# Patient Record
Sex: MALE | Race: WHITE | NOT HISPANIC OR LATINO | Employment: FULL TIME | URBAN - METROPOLITAN AREA
[De-identification: names, ages, dates, MRNs, and addresses within clinical notes are randomized per-mention and may not be internally consistent; named-entity substitution may affect disease eponyms.]

---

## 2017-11-20 ENCOUNTER — CLINICAL SUPPORT (OUTPATIENT)
Dept: URGENT CARE | Facility: CLINIC | Age: 27
End: 2017-11-20

## 2017-11-20 DIAGNOSIS — Z00.00 PHYSICAL EXAM: Primary | ICD-10-CM

## 2017-11-20 PROCEDURE — 99499 UNLISTED E&M SERVICE: CPT | Mod: S$GLB,,,

## 2018-05-24 ENCOUNTER — CLINICAL SUPPORT (OUTPATIENT)
Dept: OCCUPATIONAL MEDICINE | Facility: CLINIC | Age: 28
End: 2018-05-24

## 2018-05-24 DIAGNOSIS — Z02.1 ENCOUNTER FOR PRE-EMPLOYMENT EXAMINATION: ICD-10-CM

## 2018-05-24 PROCEDURE — 99499 UNLISTED E&M SERVICE: CPT | Mod: S$GLB,,, | Performed by: PREVENTIVE MEDICINE

## 2018-09-06 ENCOUNTER — CLINICAL SUPPORT (OUTPATIENT)
Dept: URGENT CARE | Facility: CLINIC | Age: 28
End: 2018-09-06

## 2018-09-06 DIAGNOSIS — Z00.00 PHYSICAL EXAM: Primary | ICD-10-CM

## 2018-09-06 PROCEDURE — 99499 UNLISTED E&M SERVICE: CPT | Mod: S$GLB,,, | Performed by: PREVENTIVE MEDICINE

## 2020-05-19 ENCOUNTER — OCCUPATIONAL HEALTH (OUTPATIENT)
Dept: URGENT CARE | Facility: CLINIC | Age: 30
End: 2020-05-19
Payer: COMMERCIAL

## 2020-05-19 DIAGNOSIS — Z02.89 ENCOUNTER FOR EXAMINATION REQUIRED BY DEPARTMENT OF TRANSPORTATION (DOT): Primary | ICD-10-CM

## 2020-05-19 PROCEDURE — 99499 UNLISTED E&M SERVICE: CPT | Mod: S$GLB,,, | Performed by: NURSE PRACTITIONER

## 2020-05-19 PROCEDURE — 99499 PHYSICAL, RECERT DOT/CDL: ICD-10-PCS | Mod: S$GLB,,, | Performed by: NURSE PRACTITIONER

## 2020-07-23 DIAGNOSIS — Z00.00 ROUTINE GENERAL MEDICAL EXAMINATION AT A HEALTH CARE FACILITY: Primary | ICD-10-CM

## 2020-07-24 ENCOUNTER — CLINICAL SUPPORT (OUTPATIENT)
Dept: INTERNAL MEDICINE | Facility: CLINIC | Age: 30
End: 2020-07-24
Payer: COMMERCIAL

## 2020-07-24 ENCOUNTER — OFFICE VISIT (OUTPATIENT)
Dept: INTERNAL MEDICINE | Facility: CLINIC | Age: 30
End: 2020-07-24
Payer: COMMERCIAL

## 2020-07-24 ENCOUNTER — HOSPITAL ENCOUNTER (OUTPATIENT)
Dept: CARDIOLOGY | Facility: CLINIC | Age: 30
Discharge: HOME | End: 2020-07-24
Payer: COMMERCIAL

## 2020-07-24 ENCOUNTER — HOSPITAL ENCOUNTER (OUTPATIENT)
Dept: RADIOLOGY | Facility: HOSPITAL | Age: 30
Discharge: HOME | End: 2020-07-24
Attending: INTERNAL MEDICINE
Payer: COMMERCIAL

## 2020-07-24 VITALS
HEART RATE: 74 BPM | SYSTOLIC BLOOD PRESSURE: 134 MMHG | TEMPERATURE: 98 F | WEIGHT: 218 LBS | DIASTOLIC BLOOD PRESSURE: 76 MMHG

## 2020-07-24 DIAGNOSIS — Z00.00 ENCOUNTER FOR ANNUAL HEALTH EXAMINATION: Primary | ICD-10-CM

## 2020-07-24 DIAGNOSIS — Z00.00 ROUTINE GENERAL MEDICAL EXAMINATION AT A HEALTH CARE FACILITY: ICD-10-CM

## 2020-07-24 DIAGNOSIS — E78.00 PURE HYPERCHOLESTEROLEMIA: ICD-10-CM

## 2020-07-24 DIAGNOSIS — Z00.00 ROUTINE GENERAL MEDICAL EXAMINATION AT A HEALTH CARE FACILITY: Primary | ICD-10-CM

## 2020-07-24 DIAGNOSIS — R74.8 ELEVATED LIVER ENZYMES: ICD-10-CM

## 2020-07-24 PROBLEM — E78.5 HYPERLIPIDEMIA: Status: ACTIVE | Noted: 2020-07-24

## 2020-07-24 LAB
ALBUMIN SERPL BCP-MCNC: 4.5 G/DL (ref 3.5–5.2)
ALP SERPL-CCNC: 69 U/L (ref 55–135)
ALT SERPL W/O P-5'-P-CCNC: 83 U/L (ref 10–44)
ANION GAP SERPL CALC-SCNC: 7 MMOL/L (ref 8–16)
AST SERPL-CCNC: 52 U/L (ref 10–40)
BILIRUB SERPL-MCNC: 1 MG/DL (ref 0.1–1)
BUN SERPL-MCNC: 14 MG/DL (ref 6–20)
CALCIUM SERPL-MCNC: 9.2 MG/DL (ref 8.7–10.5)
CHLORIDE SERPL-SCNC: 106 MMOL/L (ref 95–110)
CHOLEST SERPL-MCNC: 265 MG/DL (ref 120–199)
CHOLEST/HDLC SERPL: 4.4 {RATIO} (ref 2–5)
CO2 SERPL-SCNC: 25 MMOL/L (ref 23–29)
CREAT SERPL-MCNC: 1.2 MG/DL (ref 0.5–1.4)
ERYTHROCYTE [DISTWIDTH] IN BLOOD BY AUTOMATED COUNT: 12.9 % (ref 11.5–14.5)
EST. GFR  (AFRICAN AMERICAN): >60 ML/MIN/1.73 M^2
EST. GFR  (NON AFRICAN AMERICAN): >60 ML/MIN/1.73 M^2
ESTIMATED AVG GLUCOSE: 91 MG/DL (ref 68–131)
GLUCOSE SERPL-MCNC: 90 MG/DL (ref 70–110)
HBA1C MFR BLD HPLC: 4.8 % (ref 4–5.6)
HCT VFR BLD AUTO: 52.4 % (ref 40–54)
HDLC SERPL-MCNC: 60 MG/DL (ref 40–75)
HDLC SERPL: 22.6 % (ref 20–50)
HGB BLD-MCNC: 17 G/DL (ref 14–18)
LDLC SERPL CALC-MCNC: 189 MG/DL (ref 63–159)
MCH RBC QN AUTO: 30.7 PG (ref 27–31)
MCHC RBC AUTO-ENTMCNC: 32.4 G/DL (ref 32–36)
MCV RBC AUTO: 95 FL (ref 82–98)
NONHDLC SERPL-MCNC: 205 MG/DL
PLATELET # BLD AUTO: 243 K/UL (ref 150–350)
PMV BLD AUTO: 9.4 FL (ref 9.2–12.9)
POTASSIUM SERPL-SCNC: 4.4 MMOL/L (ref 3.5–5.1)
PROT SERPL-MCNC: 7.5 G/DL (ref 6–8.4)
RBC # BLD AUTO: 5.53 M/UL (ref 4.6–6.2)
SODIUM SERPL-SCNC: 138 MMOL/L (ref 136–145)
TRIGL SERPL-MCNC: 80 MG/DL (ref 30–150)
WBC # BLD AUTO: 6.41 K/UL (ref 3.9–12.7)

## 2020-07-24 PROCEDURE — 93010 ELECTROCARDIOGRAM REPORT: CPT | Mod: S$GLB,,, | Performed by: INTERNAL MEDICINE

## 2020-07-24 PROCEDURE — 80061 LIPID PANEL: CPT

## 2020-07-24 PROCEDURE — 71046 XR CHEST PA AND LATERAL: ICD-10-PCS | Mod: 26,,, | Performed by: RADIOLOGY

## 2020-07-24 PROCEDURE — 85027 COMPLETE CBC AUTOMATED: CPT

## 2020-07-24 PROCEDURE — 99999 PR PBB SHADOW E&M-EST. PATIENT-LVL III: ICD-10-PCS | Mod: PBBFAC,,, | Performed by: INTERNAL MEDICINE

## 2020-07-24 PROCEDURE — 99385 PR PREVENTIVE VISIT,NEW,18-39: ICD-10-PCS | Mod: S$GLB,,, | Performed by: INTERNAL MEDICINE

## 2020-07-24 PROCEDURE — 93005 EKG 12-LEAD: ICD-10-PCS | Mod: S$GLB,,, | Performed by: INTERNAL MEDICINE

## 2020-07-24 PROCEDURE — 80053 COMPREHEN METABOLIC PANEL: CPT

## 2020-07-24 PROCEDURE — 93005 ELECTROCARDIOGRAM TRACING: CPT | Mod: S$GLB,,, | Performed by: INTERNAL MEDICINE

## 2020-07-24 PROCEDURE — 99385 PREV VISIT NEW AGE 18-39: CPT | Mod: S$GLB,,, | Performed by: INTERNAL MEDICINE

## 2020-07-24 PROCEDURE — 36415 COLL VENOUS BLD VENIPUNCTURE: CPT

## 2020-07-24 PROCEDURE — 83036 HEMOGLOBIN GLYCOSYLATED A1C: CPT

## 2020-07-24 PROCEDURE — 71046 X-RAY EXAM CHEST 2 VIEWS: CPT | Mod: 26,,, | Performed by: RADIOLOGY

## 2020-07-24 PROCEDURE — 71046 X-RAY EXAM CHEST 2 VIEWS: CPT | Mod: TC,FY

## 2020-07-24 PROCEDURE — 99999 PR PBB SHADOW E&M-EST. PATIENT-LVL III: CPT | Mod: PBBFAC,,, | Performed by: INTERNAL MEDICINE

## 2020-07-24 PROCEDURE — 93010 EKG 12-LEAD: ICD-10-PCS | Mod: S$GLB,,, | Performed by: INTERNAL MEDICINE

## 2020-07-24 NOTE — LETTER
7/24/2020    Freda Snyder  [address] [place] [address]       [facility]  [facility] [address]  Phone: [phone]  Fax: [phone] Dear Mr. Synder:    Thank you for allowing me to serve you and perform your Executive Health exam on 7/24/2020.  This letter will serve a brief summary of the history, findings and discussions at that time. I have included the lab and study results for you to have available at future healthcare appointments.      Reason for Visit: Executive Health Preventive Physical Examination      Past Medical History:  Past Medical History:   Diagnosis Date    Ngby-Rcyrt-Xcdgqay disease     Osgood-Schlatter's disease          Physical Exam: Skin tag on left mid buttocks noted. [employer], no concerning findings on exam.      Labs:  Comprehensive metabolic panel    Collection Time: 07/24/20 10:13 AM   Result Value Ref Range    Sodium 138 136 - 145 mmol/L    Potassium 4.4 3.5 - 5.1 mmol/L    Chloride 106 95 - 110 mmol/L    CO2 25 23 - 29 mmol/L    Glucose 90 70 - 110 mg/dL    BUN, Bld 14 6 - 20 mg/dL    Creatinine 1.2 0.5 - 1.4 mg/dL    Calcium 9.2 8.7 - 10.5 mg/dL    Total Protein 7.5 6.0 - 8.4 g/dL    Albumin 4.5 3.5 - 5.2 g/dL    Total Bilirubin 1.0 0.1 - 1.0 mg/dL    Alkaline Phosphatase 69 55 - 135 U/L    AST 52 (H) 10 - 40 U/L    ALT 83 (H) 10 - 44 U/L    Anion Gap 7 (L) 8 - 16 mmol/L    eGFR if African American >60.0 >60 mL/min/1.73 m^2    eGFR if non African American >60.0 >60 mL/min/1.73 m^2   CBC Without Differential    Collection Time: 07/24/20 10:13 AM   Result Value Ref Range    WBC 6.41 3.90 - 12.70 K/uL    RBC 5.53 4.60 - 6.20 M/uL    Hemoglobin 17.0 14.0 - 18.0 g/dL    Hematocrit 52.4 40.0 - 54.0 %    Mean Corpuscular Volume 95 82 - 98 fL    Mean Corpuscular Hemoglobin 30.7 27.0 - 31.0 pg    Mean Corpuscular Hemoglobin Conc 32.4 32.0 - 36.0 g/dL    RDW 12.9 11.5 - 14.5 %    Platelets 243 150 - 350 K/uL    MPV  9.4 9.2 - 12.9 fL   Lipid panel    Collection Time: 07/24/20 10:13 AM   Result Value Ref Range    Cholesterol 265 (H) 120 - 199 mg/dL    Triglycerides 80 30 - 150 mg/dL    HDL 60 40 - 75 mg/dL    LDL Cholesterol 189.0 (H) 63.0 - 159.0 mg/dL    Hdl/Cholesterol Ratio 22.6 20.0 - 50.0 %    Total Cholesterol/HDL Ratio 4.4 2.0 - 5.0    Non-HDL Cholesterol 205 mg/dL   Hemoglobin A1c    Collection Time: 07/24/20 10:13 AM   Result Value Ref Range    Hemoglobin A1C 4.8 4.0 - 5.6 %    Estimated Avg Glucose 91 68 - 131 mg/dL     EKG: Normal sinus rhythm    Chest Xray: No evidence of disease.      Assessment/Recommendations:    Health Maintenance and Prevention:  - Annual flu vaccine.  - Tetanus booster updated within past 10 years.    - Blood pressure and blood sugars in normal range.       High cholesterol - Significantly elevated but reasonable to focus for now on diet, weight loss, and alcohol moderation. Repeat cholesterol level in 3 months.     Elevated liver enzymes - Weight loss, cholesterol control, and alcohol moderation will all be very important in management. Can plan for repeat liver test in 3 months and may consider further testing at that time if still elevated.     Left earwax impaction - Try over the counter Debrox drops for 3 days.     Skin tag, left inner buttocks - Dermatology evaluation reasonable and suspect they could easily remove this if you were interested.       It was a pleasure meeting you for your health exam, Mr. Snyder. Plan to return in 3 months for follow up lab work.    If you have any questions or concerns, please don't hesitate to call.    Sincerely,    Ebenezer Quigley MD

## 2020-07-25 NOTE — PROGRESS NOTES
Subjective:       Patient ID: Freda Snyder is a 29 y.o. male.    Chief Complaint: Annual Exam    HPI:  Here for annual health exam. No acute complaints. Long standing lesion on inner buttocks that gets irritated occasionally. Works for [employer]. Admits to regular alcohol use but typically keeps it to a glass of whiskey or couple glasses of wine.   Hx of Osgood Schlatter and Leg Calve Perthies disease. No surgery done and remains well functioning.    HM:  Tetanus booster w/i 10 yrs.  Adopted. Requesting family medical hx though.    Review of Systems   Constitutional: Negative for fatigue, fever and unexpected weight change.   HENT: Negative for hearing loss and sinus pain.    Eyes: Negative for visual disturbance.   Respiratory: Negative for cough and shortness of breath.    Cardiovascular: Negative for chest pain and leg swelling.   Gastrointestinal: Negative for abdominal pain, diarrhea and nausea.   Genitourinary: Negative for difficulty urinating, dysuria and frequency.   Musculoskeletal: Positive for arthralgias. Negative for joint swelling.   Skin: Negative for rash and wound.   Neurological: Negative for dizziness, weakness and headaches.   Psychiatric/Behavioral: Negative for dysphoric mood. The patient is not nervous/anxious.        Past Medical History:   Diagnosis Date    Elbn-Peftw-Hccafxl disease     Osgood-Schlatter's disease      No current outpatient medications on file.     Past Surgical History:   Procedure Laterality Date    TONSILLECTOMY       Family History   Adopted: Yes     Social History     Tobacco Use    Smoking status: Former Smoker   Substance Use Topics    Alcohol use: Yes     Frequency: 2-3 times a week     Comment: Glass of whiskey or couple glasses of wine typically.       Objective:      Vitals:    07/24/20 1050   BP: 134/76   Pulse: 74   Temp: 98.2 °F (36.8 °C)     Physical Exam  Constitutional:       General: He is not in acute distress.     Appearance: Normal  appearance. He is well-developed. He is not ill-appearing.   HENT:      Head: Normocephalic and atraumatic.      Right Ear: Hearing and external ear normal. There is no impacted cerumen.      Left Ear: Hearing and external ear normal. There is impacted cerumen.   Eyes:      Extraocular Movements: Extraocular movements intact.      Conjunctiva/sclera: Conjunctivae normal.   Cardiovascular:      Rate and Rhythm: Normal rate and regular rhythm.      Heart sounds: Normal heart sounds. No murmur.   Pulmonary:      Effort: Pulmonary effort is normal. No respiratory distress.      Breath sounds: Normal breath sounds.   Abdominal:      General: Abdomen is flat. There is no distension.      Palpations: Abdomen is soft.      Tenderness: There is no abdominal tenderness.   Musculoskeletal:         General: No swelling or deformity.   Skin:     General: Skin is warm and dry.      Findings: Lesion (Left buttocks lesion consistent with skin tag, non-inflamed currently.) present. No rash.   Neurological:      General: No focal deficit present.      Mental Status: He is alert and oriented to person, place, and time.      Cranial Nerves: No cranial nerve deficit.      Coordination: Coordination normal.      Gait: Gait normal.   Psychiatric:         Mood and Affect: Mood normal.         Behavior: Behavior normal.         Thought Content: Thought content normal.         Judgment: Judgment normal.       Recent Results (from the past 2016 hour(s))   Comprehensive metabolic panel    Collection Time: 07/24/20 10:13 AM   Result Value Ref Range    Sodium 138 136 - 145 mmol/L    Potassium 4.4 3.5 - 5.1 mmol/L    Chloride 106 95 - 110 mmol/L    CO2 25 23 - 29 mmol/L    Glucose 90 70 - 110 mg/dL    BUN, Bld 14 6 - 20 mg/dL    Creatinine 1.2 0.5 - 1.4 mg/dL    Calcium 9.2 8.7 - 10.5 mg/dL    Total Protein 7.5 6.0 - 8.4 g/dL    Albumin 4.5 3.5 - 5.2 g/dL    Total Bilirubin 1.0 0.1 - 1.0 mg/dL    Alkaline Phosphatase 69 55 - 135 U/L    AST 52 (H)  10 - 40 U/L    ALT 83 (H) 10 - 44 U/L    Anion Gap 7 (L) 8 - 16 mmol/L    eGFR if African American >60.0 >60 mL/min/1.73 m^2    eGFR if non African American >60.0 >60 mL/min/1.73 m^2   CBC Without Differential    Collection Time: 07/24/20 10:13 AM   Result Value Ref Range    WBC 6.41 3.90 - 12.70 K/uL    RBC 5.53 4.60 - 6.20 M/uL    Hemoglobin 17.0 14.0 - 18.0 g/dL    Hematocrit 52.4 40.0 - 54.0 %    Mean Corpuscular Volume 95 82 - 98 fL    Mean Corpuscular Hemoglobin 30.7 27.0 - 31.0 pg    Mean Corpuscular Hemoglobin Conc 32.4 32.0 - 36.0 g/dL    RDW 12.9 11.5 - 14.5 %    Platelets 243 150 - 350 K/uL    MPV 9.4 9.2 - 12.9 fL   Lipid panel    Collection Time: 07/24/20 10:13 AM   Result Value Ref Range    Cholesterol 265 (H) 120 - 199 mg/dL    Triglycerides 80 30 - 150 mg/dL    HDL 60 40 - 75 mg/dL    LDL Cholesterol 189.0 (H) 63.0 - 159.0 mg/dL    Hdl/Cholesterol Ratio 22.6 20.0 - 50.0 %    Total Cholesterol/HDL Ratio 4.4 2.0 - 5.0    Non-HDL Cholesterol 205 mg/dL   Hemoglobin A1c    Collection Time: 07/24/20 10:13 AM   Result Value Ref Range    Hemoglobin A1C 4.8 4.0 - 5.6 %    Estimated Avg Glucose 91 68 - 131 mg/dL      EKG: NSR  CXR: No obvious disease seen.      Assessment/Plan:     1) Health Maintenance and Prevention:  - Annual flu vaccine  - Tetanus booster w/i 10 years.  - BP, BG wnl.    2) HLD - . Discussed significant elevation. Discussed diet, weight loss goals, alcohol moderation. Repeat lipid panel in 3 months.    3) Elevated liver enzymes - Moderate with ALT>AST. High risk for NAFLD. Discussed goals for weight loss and cholesterol control with plan for repeat CMP in 3 months. Hep screening and imaging at that time if still elevated.    4) Left ear cerumen impaction, approximately 90% but with hearing intact - Discussed trying OTC Debrox then consider going for removal to ENT or local clinic which he has done in past if persistent.    5) Skin tag, left inner buttocks - Occasional irritation.  Discussed Derm for removal.

## 2020-08-17 ENCOUNTER — OFFICE VISIT (OUTPATIENT)
Dept: URGENT CARE | Facility: CLINIC | Age: 30
End: 2020-08-17
Payer: OTHER MISCELLANEOUS

## 2020-08-17 DIAGNOSIS — M79.672 LEFT FOOT PAIN: ICD-10-CM

## 2020-08-17 DIAGNOSIS — S90.32XA CONTUSION OF LEFT FOOT, INITIAL ENCOUNTER: Primary | ICD-10-CM

## 2020-08-17 DIAGNOSIS — Z02.83 ENCOUNTER FOR DRUG SCREENING: ICD-10-CM

## 2020-08-17 PROCEDURE — 99214 PR OFFICE/OUTPT VISIT, EST, LEVL IV, 30-39 MIN: ICD-10-PCS | Mod: 25,S$GLB,, | Performed by: NURSE PRACTITIONER

## 2020-08-17 PROCEDURE — 99214 OFFICE O/P EST MOD 30 MIN: CPT | Mod: 25,S$GLB,, | Performed by: NURSE PRACTITIONER

## 2020-08-17 PROCEDURE — 80305 OOH COLLECTION ONLY DRUG SCREEN: ICD-10-PCS | Mod: S$GLB,,, | Performed by: NURSE PRACTITIONER

## 2020-08-17 PROCEDURE — 73630 XR FOOT COMPLETE 3 VIEW LEFT: ICD-10-PCS | Mod: FY,LT,S$GLB, | Performed by: RADIOLOGY

## 2020-08-17 PROCEDURE — 73630 X-RAY EXAM OF FOOT: CPT | Mod: FY,LT,S$GLB, | Performed by: RADIOLOGY

## 2020-08-17 PROCEDURE — 80305 DRUG TEST PRSMV DIR OPT OBS: CPT | Mod: S$GLB,,, | Performed by: NURSE PRACTITIONER

## 2020-08-17 RX ORDER — IBUPROFEN 200 MG
400 TABLET ORAL
COMMUNITY
Start: 2020-08-17 | End: 2021-08-16

## 2020-08-17 NOTE — LETTER
[facility]  [address], [address]  [place] [address]  Phone: [phone]  Fax: [phone]  [facility]: [id number]     Name: Freda Snyder  Injury Date: 08/12/2020   Employee ID: [id number] Date of Treatment: 08/17/2020   Company: [employer]      Appointment Time: Arrived: 9:19 AM   Provider: Sally Contreras NP Time Out: 12:01 PM     Office Treatment:   1. Contusion of left foot, initial encounter    2. Left foot pain    3. Encounter for drug screening      Medications Ordered This Encounter   Medications    ibuprofen (ADVIL,MOTRIN) 200 MG tablet      Patient Instructions: Attention not to aggravate affected area, Daily home exercises/warm soaks(May alternate ice and heat.)    Restrictions: Sedentary work only     Return Appointment: 8/24/2020 at 10:00 AM       KD

## 2020-08-17 NOTE — PROGRESS NOTES
Subjective:       Patient ID: Freda Snyder is a 29 y.o. male.    Chief Complaint: Foot Injury (LT)    Pt works for [employer] as a [employer]. Pt states he was at work on 8/12/2020 on a 5 ft pallet on the back of the truck reaching to grab a box of the shelf when his LT FOOT slipped down and went through a hole. Pt states he didn't feel any pain but he did have some swelling. On Saturday the swelling went away but his big toe on his LT FOOT began to have sharp and aching pain. He was not seen anywhere his is his initial visit. IJ  No previous injury. He applied a compression sleeve, elevated foot and applied ice. Took Ibuprofen 400 mg with relief. Says the swelling has resolved and and bruising is improving. Has pain with ambulation and weight bearing. MWT    Foot Injury   The incident occurred 3 to 5 days ago. The incident occurred at work. The injury mechanism was a direct blow. The pain is present in the left foot. The quality of the pain is described as aching (sharp). The pain is at a severity of 5/10. The pain is moderate. The pain has been constant since onset. Pertinent negatives include no inability to bear weight, muscle weakness or numbness. He reports no foreign bodies present. The symptoms are aggravated by palpation and weight bearing. He has tried nothing for the symptoms. The treatment provided no relief.       Constitution: Negative for chills, fatigue and fever.   HENT: Negative for congestion and sore throat.    Neck: Negative for painful lymph nodes.   Cardiovascular: Negative for chest pain and leg swelling.   Eyes: Negative for double vision and blurred vision.   Respiratory: Negative for cough and shortness of breath.    Gastrointestinal: Negative for nausea, vomiting and diarrhea.   Genitourinary: Negative for dysuria, frequency and urgency.   Musculoskeletal: Positive for pain, joint pain, joint swelling, pain with walking and muscle cramps. Negative for muscle ache.   Skin:  Positive for bruising. Negative for pale and rash.   Allergic/Immunologic: Negative for seasonal allergies.   Neurological: Negative for dizziness, history of vertigo, light-headedness, passing out, headaches and numbness.   Hematologic/Lymphatic: Negative for swollen lymph nodes, easy bruising/bleeding and history of blood clots. Does not bruise/bleed easily.   Psychiatric/Behavioral: Negative for nervous/anxious, sleep disturbance and depression. The patient is not nervous/anxious.         Objective:      Physical Exam  Vitals signs and nursing note reviewed.   Constitutional:       Appearance: Normal appearance.   HENT:      Right Ear: External ear normal.      Left Ear: External ear normal.   Eyes:      Conjunctiva/sclera: Conjunctivae normal.   Cardiovascular:      Rate and Rhythm: Normal rate and regular rhythm.      Pulses: Normal pulses.      Heart sounds: Normal heart sounds.   Pulmonary:      Effort: Pulmonary effort is normal.      Breath sounds: Normal breath sounds.   Abdominal:      General: Bowel sounds are normal.      Palpations: Abdomen is soft.   Musculoskeletal:         General: Tenderness present.      Left ankle: Normal.      Left foot: Decreased range of motion. Normal capillary refill. Tenderness and bony tenderness present. No swelling, deformity or laceration.        Feet:       Comments: TTP to medial aspect L foot just proximal to great toe. Mild erythema, ecchymosis resolving. Pain with dorsiflexion and plantarflexion especially dorsiflexion of great toe. Antalgic gait. NV intact.   Skin:     General: Skin is warm and dry.   Neurological:      General: No focal deficit present.      Mental Status: He is alert and oriented to person, place, and time.   Psychiatric:         Mood and Affect: Mood normal.         Behavior: Behavior normal.         Thought Content: Thought content normal.         Judgment: Judgment normal.         Assessment:       1. Contusion of left foot, initial encounter     2. Left foot pain    3. Encounter for drug screening        Plan:     X-ray Chest Pa And Lateral    Result Date: 7/24/2020  EXAMINATION: XR CHEST PA AND LATERAL CLINICAL HISTORY: Encounter for general adult medical examination without abnormal findings TECHNIQUE: PA and lateral views of the chest were performed. COMPARISON: None FINDINGS: Mediastinal structures are midline. Cardiac silhouette and pulmonary vascular distribution are normal. Lung volumes are normal and symmetric. I detect no pulmonary disease, pleural fluid, lymph node enlargement, cardiac decompensation, pneumothorax, pneumomediastinum, pneumoperitoneum or significant osseous abnormality.     No convincing evidence of disease. Electronically signed by: Rizwana Moses MD Date:    07/24/2020 Time:    10:44    X-ray Foot Complete 3 View Left    Result Date: 8/17/2020  EXAMINATION: XR FOOT COMPLETE 3 VIEW LEFT CLINICAL HISTORY: trauma;.  Contusion of left foot, initial encounter TECHNIQUE: AP, lateral and oblique views of the left foot were performed. COMPARISON: None FINDINGS: No fracture or dislocation.  No bone destruction identified     See above Electronically signed by: Juan M Barry MD Date:    08/17/2020 Time:    11:19   Reviewed x-ray results with patient. No fracture or dislocation.  .mwt  Medications Ordered This Encounter   Medications    ibuprofen (ADVIL,MOTRIN) 200 MG tablet     Sig: Take 2 tablets (400 mg total) by mouth after meals as needed for Other (For pain and inflammation.).     Patient Instructions: Attention not to aggravate affected area, Daily home exercises/warm soaks(May alternate ice and heat.)   Restrictions: Sedentary work only  Follow up in about 1 week (around 8/24/2020).

## 2020-08-24 ENCOUNTER — OFFICE VISIT (OUTPATIENT)
Dept: URGENT CARE | Facility: CLINIC | Age: 30
End: 2020-08-24
Payer: OTHER MISCELLANEOUS

## 2020-08-24 DIAGNOSIS — M79.672 LEFT FOOT PAIN: ICD-10-CM

## 2020-08-24 DIAGNOSIS — S90.32XD CONTUSION OF LEFT FOOT, SUBSEQUENT ENCOUNTER: Primary | ICD-10-CM

## 2020-08-24 PROCEDURE — 99213 OFFICE O/P EST LOW 20 MIN: CPT | Mod: S$GLB,,, | Performed by: NURSE PRACTITIONER

## 2020-08-24 PROCEDURE — 99213 PR OFFICE/OUTPT VISIT, EST, LEVL III, 20-29 MIN: ICD-10-PCS | Mod: S$GLB,,, | Performed by: NURSE PRACTITIONER

## 2020-08-24 NOTE — PROGRESS NOTES
Subjective:       Patient ID: Freda Snyder is a 29 y.o. male.    Chief Complaint: Foot Pain    Pt returned to the clinic for a follow up visit for [employer] on 8/12/2020. Pt states he is no longer in any pain. 0/10. IJ   Has some stiffness and very mild swelling at the end of the day. Taking Tylenol prn. Working LD. Feels a lot better this week. 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 MWT    Foot Injury   The incident occurred 3 to 5 days ago. The incident occurred at work. The injury mechanism was a direct blow. The pain is present in the left foot. Quality: sharp. The pain is at a severity of 0/10. The patient is experiencing no pain. The pain has been improving since onset. Pertinent negatives include no inability to bear weight, muscle weakness or numbness. He reports no foreign bodies present. He has tried nothing for the symptoms. The treatment provided no relief.       Constitution: Negative for chills, fatigue and fever.   HENT: Negative for congestion and sore throat.    Neck: Negative for painful lymph nodes.   Cardiovascular: Negative for chest pain and leg swelling.   Eyes: Negative for double vision and blurred vision.   Respiratory: Negative for cough and shortness of breath.    Gastrointestinal: Negative for nausea, vomiting and diarrhea.   Genitourinary: Negative for dysuria, frequency and urgency.   Musculoskeletal: Positive for pain. Negative for joint pain, joint swelling, pain with walking, muscle cramps and muscle ache.   Skin: Negative for pale, rash and bruising.   Allergic/Immunologic: Negative for seasonal allergies.   Neurological: Negative for dizziness, history of vertigo, light-headedness, passing out, headaches, numbness and tingling.   Hematologic/Lymphatic: Negative for swollen lymph nodes, easy bruising/bleeding and history of blood clots. Does not bruise/bleed easily.   Psychiatric/Behavioral: Negative for nervous/anxious, sleep disturbance and depression. The patient is not  nervous/anxious.         Objective:      Physical Exam  Constitutional:       General: He is not in acute distress.     Appearance: Normal appearance.   HENT:      Right Ear: External ear normal.      Left Ear: External ear normal.   Eyes:      Conjunctiva/sclera: Conjunctivae normal.   Cardiovascular:      Pulses: Normal pulses.   Pulmonary:      Effort: Pulmonary effort is normal.   Musculoskeletal: Normal range of motion.         General: No swelling.      Left foot: Normal range of motion and normal capillary refill. Tenderness present. No swelling or deformity.        Feet:       Comments: Mild tenderness to medial aspect L foot proximal to great toe. No swelling or ecchymosis this week. Ambulating well. Toe walks well. Mild discomfort with dorsiflexion of great toe when pushing off with ambulation. NV intact.   Skin:     General: Skin is warm and dry.      Capillary Refill: Capillary refill takes less than 2 seconds.   Neurological:      General: No focal deficit present.      Mental Status: He is alert and oriented to person, place, and time.   Psychiatric:         Mood and Affect: Mood normal.         Behavior: Behavior normal.         Thought Content: Thought content normal.         Judgment: Judgment normal.         Assessment:       1. Contusion of left foot, subsequent encounter    2. Left foot pain        Plan:            Patient Instructions: Attention not to aggravate affected area, Daily home exercises/warm soaks   Restrictions: Regular Duty, Discharged from Occupational Health  Follow up if symptoms worsen or fail to improve.

## 2020-08-24 NOTE — LETTER
[facility]  [address], [address]  [place] [address]  Phone: [phone]  Fax: [phone]  [facility]: [id number]    Pt Name: Freda Snyder  Injury Date: 08/12/2020   Employee ID: [id number] Date of Treatment: 08/24/2020   Company: [employer]      Appointment Time: 09:45 AM Arrived: 9:25 AM   Provider: Sally Contreras NP Time Out: 10:10 AM     Office Treatment:   EXAM  DISCHARGED FROM OCC HEALTH  REGULAR DUTY     1. Contusion of left foot, subsequent encounter    2. Left foot pain          Patient Instructions: Attention not to aggravate affected area, Daily home exercises/warm soaks    Restrictions: Regular Duty, Discharged from Occupational Health     Return Appointment: NONE

## 2020-10-02 ENCOUNTER — OFFICE VISIT (OUTPATIENT)
Dept: URGENT CARE | Facility: CLINIC | Age: 30
End: 2020-10-02
Payer: OTHER MISCELLANEOUS

## 2020-10-02 VITALS
BODY MASS INDEX: 32.89 KG/M2 | WEIGHT: 217 LBS | SYSTOLIC BLOOD PRESSURE: 128 MMHG | RESPIRATION RATE: 16 BRPM | OXYGEN SATURATION: 99 % | DIASTOLIC BLOOD PRESSURE: 84 MMHG | TEMPERATURE: 99 F | HEIGHT: 68 IN | HEART RATE: 64 BPM

## 2020-10-02 DIAGNOSIS — Z02.83 ENCOUNTER FOR DRUG SCREENING: Primary | ICD-10-CM

## 2020-10-02 DIAGNOSIS — S90.112D CONTUSION OF LEFT GREAT TOE WITHOUT DAMAGE TO NAIL, SUBSEQUENT ENCOUNTER: ICD-10-CM

## 2020-10-02 DIAGNOSIS — S93.502D SPRAIN OF LEFT GREAT TOE, SUBSEQUENT ENCOUNTER: ICD-10-CM

## 2020-10-02 PROCEDURE — 73630 XR FOOT COMPLETE 3 VIEW LEFT: ICD-10-PCS | Mod: FY,LT,S$GLB, | Performed by: RADIOLOGY

## 2020-10-02 PROCEDURE — 73630 X-RAY EXAM OF FOOT: CPT | Mod: FY,LT,S$GLB, | Performed by: RADIOLOGY

## 2020-10-02 PROCEDURE — 80305 OOH COLLECTION ONLY DRUG SCREEN: ICD-10-PCS | Mod: S$GLB,,, | Performed by: PREVENTIVE MEDICINE

## 2020-10-02 PROCEDURE — 99214 OFFICE O/P EST MOD 30 MIN: CPT | Mod: S$GLB,,, | Performed by: PREVENTIVE MEDICINE

## 2020-10-02 PROCEDURE — 99214 PR OFFICE/OUTPT VISIT, EST, LEVL IV, 30-39 MIN: ICD-10-PCS | Mod: S$GLB,,, | Performed by: PREVENTIVE MEDICINE

## 2020-10-02 PROCEDURE — 80305 DRUG TEST PRSMV DIR OPT OBS: CPT | Mod: S$GLB,,, | Performed by: PREVENTIVE MEDICINE

## 2020-10-02 RX ORDER — MELOXICAM 7.5 MG/1
7.5 TABLET ORAL 2 TIMES DAILY WITH MEALS
Qty: 30 TABLET | Refills: 1 | Status: SHIPPED | OUTPATIENT
Start: 2020-10-02 | End: 2021-08-16

## 2020-10-02 NOTE — PROGRESS NOTES
Subjective:       Patient ID: Freda Snyder is a 29 y.o. male.    Chief Complaint: Foot Injury    Pt works for [employer] as a [employer] . Pt states he was at work on 8/12/2020 on a 5 ft pallet on the back of the truck reaching to grab a box of the shelf when his LT FOOT slipped down and went through a hole. Pt states he didn't feel any pain but he did have some swelling. On the Saturday after the swelling went away but his big toe on his LT FOOT began to have sharp and aching pain. He was seen for this injury here by [facility]. He states he was not working on the truck for 2 months after but now he's back on the truck and having pain in his LT FOOT AGAIN. When flexing his foot he feels popping pain in his big toe. IJ    Foot Injury   The incident occurred more than 1 week ago. The incident occurred at work. The injury mechanism is unknown. The pain is present in the left foot. The quality of the pain is described as aching. The pain is at a severity of 6/10. The pain is mild. The pain has been constant since onset. Associated symptoms include muscle weakness. Pertinent negatives include no inability to bear weight or numbness. He reports no foreign bodies present. The symptoms are aggravated by weight bearing. He has tried nothing for the symptoms. The treatment provided no relief.       Constitution: Negative for chills, fatigue and fever.   HENT: Negative for congestion and sore throat.    Neck: Negative for painful lymph nodes.   Cardiovascular: Negative for chest pain and leg swelling.   Eyes: Negative for double vision and blurred vision.   Respiratory: Negative for cough and shortness of breath.    Gastrointestinal: Negative for nausea, vomiting and diarrhea.   Genitourinary: Negative for dysuria, frequency and urgency.   Musculoskeletal: Positive for pain. Negative for joint pain, joint swelling, pain with walking, muscle cramps and muscle ache.   Skin: Negative for pale, rash and bruising.    Allergic/Immunologic: Negative for seasonal allergies.   Neurological: Negative for dizziness, history of vertigo, light-headedness, passing out, headaches, numbness and tingling.   Hematologic/Lymphatic: Negative for swollen lymph nodes, easy bruising/bleeding and history of blood clots. Does not bruise/bleed easily.   Psychiatric/Behavioral: Negative for nervous/anxious, sleep disturbance and depression. The patient is not nervous/anxious.         Objective:      Physical Exam  Vitals signs and nursing note reviewed.   Constitutional:       Appearance: He is well-developed.   HENT:      Head: Normocephalic.   Eyes:      Pupils: Pupils are equal, round, and reactive to light.   Neck:      Musculoskeletal: Normal range of motion.   Cardiovascular:      Rate and Rhythm: Normal rate.   Pulmonary:      Effort: Pulmonary effort is normal.   Musculoskeletal:      Left foot: Decreased range of motion. Normal capillary refill. Tenderness and crepitus present. No bony tenderness, swelling, deformity or laceration.        Feet:       Comments: Patient complains of persistent pain and swelling about his left great toe primarily at the metatarsal phalangeal joint.  Patient also indicates that he has crepitus with flexion and extension of the great toe.  His left foot appears without ecchymosis and with minimal swelling at the base of the left great toe.  Vascular inflow including dorsalis pedis and posterior tibialis are both normal.   Skin:     General: Skin is warm and dry.   Neurological:      Mental Status: He is alert and oriented to person, place, and time.       X-ray Foot Complete Left    Result Date: 10/2/2020  EXAMINATION: XR FOOT COMPLETE 3 VIEW LEFT CLINICAL HISTORY: Contusion of left great toe without damage to nail, subsequent encounter FINDINGS: No fracture dislocation bone destruction seen.  No trauma seen. Electronically signed by: Javier Clemons MD Date:    10/02/2020 Time:    11:19    Assessment:       1.  Encounter for drug screening    2. Contusion of left great toe without damage to nail, subsequent encounter    3. Sprain of left great toe, subsequent encounter        Plan:       Discussed results of x-rays of the left foot with patient which revealed no acute fractures or bony abnormalities.  Patient will begin warm soaks with exercises on a daily basis.  Medications Ordered This Encounter   Medications    meloxicam (MOBIC) 7.5 MG tablet     Sig: Take 1 tablet (7.5 mg total) by mouth 2 (two) times daily with meals.     Dispense:  30 tablet     Refill:  1     Patient Instructions: Daily home exercises/warm soaks   Restrictions: Regular Duty  Follow up in about 1 week (around 10/9/2020).

## 2021-05-05 ENCOUNTER — IMMUNIZATION (OUTPATIENT)
Dept: PRIMARY CARE CLINIC | Facility: CLINIC | Age: 31
End: 2021-05-05
Payer: COMMERCIAL

## 2021-05-05 DIAGNOSIS — Z23 NEED FOR VACCINATION: Primary | ICD-10-CM

## 2021-05-05 PROCEDURE — 91303 COVID-19,VECTOR-NR,RS-AD26,PF,0.5 ML DOSE VACCINE (JANSSEN): ICD-10-PCS | Mod: S$GLB,,, | Performed by: FAMILY MEDICINE

## 2021-05-05 PROCEDURE — 91303 COVID-19,VECTOR-NR,RS-AD26,PF,0.5 ML DOSE VACCINE (JANSSEN): CPT | Mod: S$GLB,,, | Performed by: FAMILY MEDICINE

## 2021-05-05 PROCEDURE — 0031A COVID-19,VECTOR-NR,RS-AD26,PF,0.5 ML DOSE VACCINE (JANSSEN): ICD-10-PCS | Mod: CV19,S$GLB,, | Performed by: FAMILY MEDICINE

## 2021-05-05 PROCEDURE — 0031A COVID-19,VECTOR-NR,RS-AD26,PF,0.5 ML DOSE VACCINE (JANSSEN): CPT | Mod: CV19,S$GLB,, | Performed by: FAMILY MEDICINE

## 2021-08-16 ENCOUNTER — OFFICE VISIT (OUTPATIENT)
Dept: URGENT CARE | Facility: CLINIC | Age: 31
End: 2021-08-16
Payer: COMMERCIAL

## 2021-08-16 VITALS
RESPIRATION RATE: 19 BRPM | OXYGEN SATURATION: 98 % | BODY MASS INDEX: 32.58 KG/M2 | DIASTOLIC BLOOD PRESSURE: 79 MMHG | SYSTOLIC BLOOD PRESSURE: 128 MMHG | WEIGHT: 215 LBS | HEART RATE: 60 BPM | TEMPERATURE: 99 F | HEIGHT: 68 IN

## 2021-08-16 DIAGNOSIS — S63.601A SPRAIN OF RIGHT THUMB, UNSPECIFIED SITE OF DIGIT, INITIAL ENCOUNTER: Primary | ICD-10-CM

## 2021-08-16 DIAGNOSIS — R52 PAIN: ICD-10-CM

## 2021-08-16 PROCEDURE — 73140 X-RAY EXAM OF FINGER(S): CPT | Mod: FY,RT,S$GLB, | Performed by: RADIOLOGY

## 2021-08-16 PROCEDURE — 73140 XR FINGER 2 OR MORE VIEWS RIGHT: ICD-10-PCS | Mod: FY,RT,S$GLB, | Performed by: RADIOLOGY

## 2021-08-16 PROCEDURE — 99214 OFFICE O/P EST MOD 30 MIN: CPT | Mod: S$GLB,,, | Performed by: NURSE PRACTITIONER

## 2021-08-16 PROCEDURE — 99214 PR OFFICE/OUTPT VISIT, EST, LEVL IV, 30-39 MIN: ICD-10-PCS | Mod: S$GLB,,, | Performed by: NURSE PRACTITIONER

## 2021-08-16 RX ORDER — IBUPROFEN 600 MG/1
600 TABLET ORAL 3 TIMES DAILY PRN
Qty: 30 TABLET | Refills: 0 | Status: SHIPPED | OUTPATIENT
Start: 2021-08-16 | End: 2021-12-17

## 2021-08-18 ENCOUNTER — OFFICE VISIT (OUTPATIENT)
Dept: ORTHOPEDICS | Facility: CLINIC | Age: 31
End: 2021-08-18
Payer: COMMERCIAL

## 2021-08-18 VITALS — HEART RATE: 80 BPM | HEIGHT: 68 IN | WEIGHT: 215 LBS | BODY MASS INDEX: 32.58 KG/M2

## 2021-08-18 DIAGNOSIS — S63.641A RUPTURE OF RADIAL COLLATERAL LIGAMENT OF RIGHT THUMB, INITIAL ENCOUNTER: Primary | ICD-10-CM

## 2021-08-18 PROCEDURE — 99204 OFFICE O/P NEW MOD 45 MIN: CPT | Mod: S$GLB,,, | Performed by: ORTHOPAEDIC SURGERY

## 2021-08-18 PROCEDURE — 99204 PR OFFICE/OUTPT VISIT, NEW, LEVL IV, 45-59 MIN: ICD-10-PCS | Mod: S$GLB,,, | Performed by: ORTHOPAEDIC SURGERY

## 2021-08-18 PROCEDURE — 99999 PR PBB SHADOW E&M-EST. PATIENT-LVL II: ICD-10-PCS | Mod: PBBFAC,,, | Performed by: ORTHOPAEDIC SURGERY

## 2021-08-18 PROCEDURE — 99999 PR PBB SHADOW E&M-EST. PATIENT-LVL II: CPT | Mod: PBBFAC,,, | Performed by: ORTHOPAEDIC SURGERY

## 2021-08-24 ENCOUNTER — PATIENT MESSAGE (OUTPATIENT)
Dept: ORTHOPEDICS | Facility: CLINIC | Age: 31
End: 2021-08-24

## 2021-08-26 ENCOUNTER — HOSPITAL ENCOUNTER (OUTPATIENT)
Dept: RADIOLOGY | Facility: HOSPITAL | Age: 31
Discharge: HOME | End: 2021-08-26
Attending: ORTHOPAEDIC SURGERY
Payer: COMMERCIAL

## 2021-08-26 DIAGNOSIS — S63.641A RUPTURE OF RADIAL COLLATERAL LIGAMENT OF RIGHT THUMB, INITIAL ENCOUNTER: ICD-10-CM

## 2021-08-26 PROCEDURE — 73218 MRI UPPER EXTREMITY W/O DYE: CPT | Mod: TC,RT

## 2021-08-26 PROCEDURE — 73218 MRI UPPER EXTREMITY W/O DYE: CPT | Mod: 26,RT,, | Performed by: INTERNAL MEDICINE

## 2021-08-26 PROCEDURE — 73218 MRI THUMB WITHOUT CONTRAST RIGHT: ICD-10-PCS | Mod: 26,RT,, | Performed by: INTERNAL MEDICINE

## 2021-08-27 ENCOUNTER — TELEPHONE (OUTPATIENT)
Dept: ORTHOPEDICS | Facility: CLINIC | Age: 31
End: 2021-08-27

## 2021-09-15 ENCOUNTER — TELEPHONE (OUTPATIENT)
Dept: ORTHOPEDICS | Facility: CLINIC | Age: 31
End: 2021-09-15

## 2021-09-16 ENCOUNTER — OFFICE VISIT (OUTPATIENT)
Dept: ORTHOPEDICS | Facility: CLINIC | Age: 31
End: 2021-09-16
Payer: COMMERCIAL

## 2021-09-16 VITALS
SYSTOLIC BLOOD PRESSURE: 158 MMHG | DIASTOLIC BLOOD PRESSURE: 81 MMHG | HEIGHT: 68 IN | BODY MASS INDEX: 32.58 KG/M2 | WEIGHT: 215 LBS | HEART RATE: 70 BPM

## 2021-09-16 DIAGNOSIS — S63.641A RUPTURE OF RADIAL COLLATERAL LIGAMENT OF RIGHT THUMB, INITIAL ENCOUNTER: Primary | ICD-10-CM

## 2021-09-16 DIAGNOSIS — Z01.818 PRE-OP TESTING: Primary | ICD-10-CM

## 2021-09-16 PROCEDURE — 99214 PR OFFICE/OUTPT VISIT, EST, LEVL IV, 30-39 MIN: ICD-10-PCS | Mod: S$GLB,,, | Performed by: ORTHOPAEDIC SURGERY

## 2021-09-16 PROCEDURE — 99999 PR PBB SHADOW E&M-EST. PATIENT-LVL III: CPT | Mod: PBBFAC,,, | Performed by: ORTHOPAEDIC SURGERY

## 2021-09-16 PROCEDURE — 99214 OFFICE O/P EST MOD 30 MIN: CPT | Mod: S$GLB,,, | Performed by: ORTHOPAEDIC SURGERY

## 2021-09-16 PROCEDURE — 99999 PR PBB SHADOW E&M-EST. PATIENT-LVL III: ICD-10-PCS | Mod: PBBFAC,,, | Performed by: ORTHOPAEDIC SURGERY

## 2021-09-20 ENCOUNTER — PATIENT MESSAGE (OUTPATIENT)
Dept: ORTHOPEDICS | Facility: CLINIC | Age: 31
End: 2021-09-20

## 2021-09-21 ENCOUNTER — TELEPHONE (OUTPATIENT)
Dept: ORTHOPEDICS | Facility: CLINIC | Age: 31
End: 2021-09-21

## 2021-09-22 DIAGNOSIS — S63.641A RUPTURE OF RADIAL COLLATERAL LIGAMENT OF RIGHT THUMB, INITIAL ENCOUNTER: Primary | ICD-10-CM

## 2021-10-11 ENCOUNTER — ANESTHESIA EVENT (OUTPATIENT)
Dept: SURGERY | Facility: HOSPITAL | Age: 31
End: 2021-10-11
Payer: COMMERCIAL

## 2021-10-15 ENCOUNTER — TELEPHONE (OUTPATIENT)
Dept: ORTHOPEDICS | Facility: CLINIC | Age: 31
End: 2021-10-15

## 2021-10-20 DIAGNOSIS — Z98.890 POST-OPERATIVE STATE: Primary | ICD-10-CM

## 2021-10-20 RX ORDER — TRAMADOL HYDROCHLORIDE 50 MG/1
50 TABLET ORAL EVERY 6 HOURS PRN
Qty: 20 TABLET | Refills: 0 | Status: SHIPPED | OUTPATIENT
Start: 2021-10-20 | End: 2021-12-17

## 2021-10-21 ENCOUNTER — TELEPHONE (OUTPATIENT)
Dept: ORTHOPEDICS | Facility: CLINIC | Age: 31
End: 2021-10-21

## 2021-10-22 ENCOUNTER — HOSPITAL ENCOUNTER (OUTPATIENT)
Facility: HOSPITAL | Age: 31
Discharge: HOME | End: 2021-10-22
Attending: ORTHOPAEDIC SURGERY | Admitting: ORTHOPAEDIC SURGERY
Payer: COMMERCIAL

## 2021-10-22 ENCOUNTER — ANESTHESIA (OUTPATIENT)
Dept: SURGERY | Facility: HOSPITAL | Age: 31
End: 2021-10-22
Payer: COMMERCIAL

## 2021-10-22 DIAGNOSIS — S63.649A: Primary | ICD-10-CM

## 2021-10-22 PROCEDURE — 76942 ECHO GUIDE FOR BIOPSY: CPT | Performed by: ANESTHESIOLOGY

## 2021-10-22 PROCEDURE — D9220A PRA ANESTHESIA: ICD-10-PCS | Mod: ,,, | Performed by: ANESTHESIOLOGY

## 2021-10-22 PROCEDURE — 27201423 OPTIME MED/SURG SUP & DEVICES STERILE SUPPLY: Performed by: ORTHOPAEDIC SURGERY

## 2021-10-22 PROCEDURE — 37000009 HC ANESTHESIA EA ADD 15 MINS: Performed by: ORTHOPAEDIC SURGERY

## 2021-10-22 PROCEDURE — 25000003 PHARM REV CODE 250: Performed by: STUDENT IN AN ORGANIZED HEALTH CARE EDUCATION/TRAINING PROGRAM

## 2021-10-22 PROCEDURE — 99900035 HC TECH TIME PER 15 MIN (STAT)

## 2021-10-22 PROCEDURE — 71000033 HC RECOVERY, INTIAL HOUR: Performed by: ORTHOPAEDIC SURGERY

## 2021-10-22 PROCEDURE — 26540 PR FIX COLLAT LIG,MC-P JT,I-P JT: ICD-10-PCS | Mod: F5,,, | Performed by: ORTHOPAEDIC SURGERY

## 2021-10-22 PROCEDURE — 76942 ECHO GUIDE FOR BIOPSY: CPT | Mod: 26,,, | Performed by: ANESTHESIOLOGY

## 2021-10-22 PROCEDURE — 36000708 HC OR TIME LEV III 1ST 15 MIN: Performed by: ORTHOPAEDIC SURGERY

## 2021-10-22 PROCEDURE — 63600175 PHARM REV CODE 636 W HCPCS: Performed by: NURSE ANESTHETIST, CERTIFIED REGISTERED

## 2021-10-22 PROCEDURE — 63600175 PHARM REV CODE 636 W HCPCS: Performed by: ANESTHESIOLOGY

## 2021-10-22 PROCEDURE — 37000008 HC ANESTHESIA 1ST 15 MINUTES: Performed by: ORTHOPAEDIC SURGERY

## 2021-10-22 PROCEDURE — 25000003 PHARM REV CODE 250: Performed by: NURSE ANESTHETIST, CERTIFIED REGISTERED

## 2021-10-22 PROCEDURE — 64415 NJX AA&/STRD BRCH PLXS IMG: CPT | Mod: 59,RT,, | Performed by: ANESTHESIOLOGY

## 2021-10-22 PROCEDURE — 63600175 PHARM REV CODE 636 W HCPCS: Performed by: STUDENT IN AN ORGANIZED HEALTH CARE EDUCATION/TRAINING PROGRAM

## 2021-10-22 PROCEDURE — 36000709 HC OR TIME LEV III EA ADD 15 MIN: Performed by: ORTHOPAEDIC SURGERY

## 2021-10-22 PROCEDURE — 25000003 PHARM REV CODE 250: Performed by: ANESTHESIOLOGY

## 2021-10-22 PROCEDURE — D9220A PRA ANESTHESIA: Mod: ,,, | Performed by: ANESTHESIOLOGY

## 2021-10-22 PROCEDURE — 64415 PR NERVE BLOCK INJ, ANES/STEROID, BRACHIAL PLEXUS, INCL IMAG GUIDANCE: ICD-10-PCS | Mod: 59,RT,, | Performed by: ANESTHESIOLOGY

## 2021-10-22 PROCEDURE — 76942 PR U/S GUIDANCE FOR NEEDLE GUIDANCE: ICD-10-PCS | Mod: 26,,, | Performed by: ANESTHESIOLOGY

## 2021-10-22 PROCEDURE — C1713 ANCHOR/SCREW BN/BN,TIS/BN: HCPCS | Performed by: ORTHOPAEDIC SURGERY

## 2021-10-22 PROCEDURE — 71000015 HC POSTOP RECOV 1ST HR: Performed by: ORTHOPAEDIC SURGERY

## 2021-10-22 PROCEDURE — 94761 N-INVAS EAR/PLS OXIMETRY MLT: CPT

## 2021-10-22 PROCEDURE — 26540 REPAIR HAND JOINT: CPT | Mod: F5,,, | Performed by: ORTHOPAEDIC SURGERY

## 2021-10-22 DEVICE — ANCHOR SUT 3-0 FW KWIRE 1.35MM: Type: IMPLANTABLE DEVICE | Site: THUMB | Status: FUNCTIONAL

## 2021-10-22 RX ORDER — FENTANYL CITRATE 50 UG/ML
25 INJECTION, SOLUTION INTRAMUSCULAR; INTRAVENOUS EVERY 5 MIN PRN
Status: DISCONTINUED | OUTPATIENT
Start: 2021-10-22 | End: 2021-10-22 | Stop reason: HOSPADM

## 2021-10-22 RX ORDER — LIDOCAINE HYDROCHLORIDE 20 MG/ML
INJECTION INTRAVENOUS
Status: DISCONTINUED | OUTPATIENT
Start: 2021-10-22 | End: 2021-10-22

## 2021-10-22 RX ORDER — PROPOFOL 10 MG/ML
VIAL (ML) INTRAVENOUS CONTINUOUS PRN
Status: DISCONTINUED | OUTPATIENT
Start: 2021-10-22 | End: 2021-10-22

## 2021-10-22 RX ORDER — CEFAZOLIN SODIUM 1 G/3ML
2 INJECTION, POWDER, FOR SOLUTION INTRAMUSCULAR; INTRAVENOUS
Status: COMPLETED | OUTPATIENT
Start: 2021-10-22 | End: 2021-10-22

## 2021-10-22 RX ORDER — DEXAMETHASONE SODIUM PHOSPHATE 4 MG/ML
INJECTION, SOLUTION INTRA-ARTICULAR; INTRALESIONAL; INTRAMUSCULAR; INTRAVENOUS; SOFT TISSUE
Status: DISCONTINUED | OUTPATIENT
Start: 2021-10-22 | End: 2021-10-22

## 2021-10-22 RX ORDER — BUPIVACAINE HYDROCHLORIDE AND EPINEPHRINE 5; 5 MG/ML; UG/ML
INJECTION, SOLUTION EPIDURAL; INTRACAUDAL; PERINEURAL
Status: COMPLETED | OUTPATIENT
Start: 2021-10-22 | End: 2021-10-22

## 2021-10-22 RX ORDER — FENTANYL CITRATE 50 UG/ML
INJECTION, SOLUTION INTRAMUSCULAR; INTRAVENOUS
Status: DISCONTINUED | OUTPATIENT
Start: 2021-10-22 | End: 2021-10-22

## 2021-10-22 RX ORDER — ONDANSETRON 2 MG/ML
4 INJECTION INTRAMUSCULAR; INTRAVENOUS DAILY PRN
Status: DISCONTINUED | OUTPATIENT
Start: 2021-10-22 | End: 2021-10-22 | Stop reason: HOSPADM

## 2021-10-22 RX ORDER — SODIUM CHLORIDE 9 MG/ML
INJECTION, SOLUTION INTRAVENOUS CONTINUOUS
Status: DISCONTINUED | OUTPATIENT
Start: 2021-10-22 | End: 2021-10-22 | Stop reason: HOSPADM

## 2021-10-22 RX ORDER — MIDAZOLAM HYDROCHLORIDE 1 MG/ML
.5-4 INJECTION INTRAMUSCULAR; INTRAVENOUS
Status: DISCONTINUED | OUTPATIENT
Start: 2021-10-22 | End: 2021-10-22 | Stop reason: HOSPADM

## 2021-10-22 RX ORDER — FAMOTIDINE 10 MG/ML
INJECTION INTRAVENOUS
Status: DISCONTINUED | OUTPATIENT
Start: 2021-10-22 | End: 2021-10-22

## 2021-10-22 RX ORDER — ACETAMINOPHEN 500 MG
1000 TABLET ORAL
Status: COMPLETED | OUTPATIENT
Start: 2021-10-22 | End: 2021-10-22

## 2021-10-22 RX ORDER — FENTANYL CITRATE 50 UG/ML
25-200 INJECTION, SOLUTION INTRAMUSCULAR; INTRAVENOUS EVERY 5 MIN PRN
Status: DISCONTINUED | OUTPATIENT
Start: 2021-10-22 | End: 2021-10-22 | Stop reason: HOSPADM

## 2021-10-22 RX ORDER — MIDAZOLAM HYDROCHLORIDE 1 MG/ML
INJECTION, SOLUTION INTRAMUSCULAR; INTRAVENOUS
Status: DISCONTINUED | OUTPATIENT
Start: 2021-10-22 | End: 2021-10-22

## 2021-10-22 RX ORDER — CELECOXIB 200 MG/1
400 CAPSULE ORAL ONCE
Status: COMPLETED | OUTPATIENT
Start: 2021-10-22 | End: 2021-10-22

## 2021-10-22 RX ORDER — ACETAMINOPHEN, DIPHENHYDRAMINE HCL, PHENYLEPHRINE HCL 325; 25; 5 MG/1; MG/1; MG/1
TABLET ORAL
COMMUNITY

## 2021-10-22 RX ORDER — PROPOFOL 10 MG/ML
VIAL (ML) INTRAVENOUS
Status: DISCONTINUED | OUTPATIENT
Start: 2021-10-22 | End: 2021-10-22

## 2021-10-22 RX ADMIN — SODIUM CHLORIDE: 0.9 INJECTION, SOLUTION INTRAVENOUS at 12:10

## 2021-10-22 RX ADMIN — DEXAMETHASONE SODIUM PHOSPHATE 8 MG: 4 INJECTION, SOLUTION INTRAMUSCULAR; INTRAVENOUS at 01:10

## 2021-10-22 RX ADMIN — FAMOTIDINE 20 MG: 10 INJECTION, SOLUTION INTRAVENOUS at 01:10

## 2021-10-22 RX ADMIN — FENTANYL CITRATE 100 MCG: 50 INJECTION, SOLUTION INTRAMUSCULAR; INTRAVENOUS at 01:10

## 2021-10-22 RX ADMIN — PROPOFOL 50 MG: 10 INJECTION, EMULSION INTRAVENOUS at 01:10

## 2021-10-22 RX ADMIN — CEFAZOLIN 2 G: 330 INJECTION, POWDER, FOR SOLUTION INTRAMUSCULAR; INTRAVENOUS at 01:10

## 2021-10-22 RX ADMIN — MIDAZOLAM 2 MG: 1 INJECTION INTRAMUSCULAR; INTRAVENOUS at 12:10

## 2021-10-22 RX ADMIN — CELECOXIB 400 MG: 200 CAPSULE ORAL at 12:10

## 2021-10-22 RX ADMIN — LIDOCAINE HYDROCHLORIDE 75 MG: 20 INJECTION, SOLUTION INTRAVENOUS at 01:10

## 2021-10-22 RX ADMIN — MIDAZOLAM HYDROCHLORIDE 2 MG: 1 INJECTION, SOLUTION INTRAMUSCULAR; INTRAVENOUS at 01:10

## 2021-10-22 RX ADMIN — BUPIVACAINE HYDROCHLORIDE AND EPINEPHRINE BITARTRATE 15 ML: 5; .0091 INJECTION, SOLUTION EPIDURAL; INTRACAUDAL; PERINEURAL at 12:10

## 2021-10-22 RX ADMIN — PROPOFOL 200 MCG/KG/MIN: 10 INJECTION, EMULSION INTRAVENOUS at 01:10

## 2021-10-22 RX ADMIN — ACETAMINOPHEN 1000 MG: 500 TABLET ORAL at 12:10

## 2021-10-22 RX ADMIN — MEPIVACAINE HYDROCHLORIDE 15 ML: 15 INJECTION, SOLUTION EPIDURAL; INFILTRATION at 12:10

## 2021-10-22 RX ADMIN — FENTANYL CITRATE 100 MCG: 50 INJECTION, SOLUTION INTRAMUSCULAR; INTRAVENOUS at 12:10

## 2021-10-25 ENCOUNTER — PATIENT MESSAGE (OUTPATIENT)
Dept: ORTHOPEDICS | Facility: CLINIC | Age: 31
End: 2021-10-25
Payer: COMMERCIAL

## 2021-10-26 VITALS
TEMPERATURE: 98 F | HEART RATE: 77 BPM | WEIGHT: 215 LBS | SYSTOLIC BLOOD PRESSURE: 148 MMHG | DIASTOLIC BLOOD PRESSURE: 88 MMHG | RESPIRATION RATE: 20 BRPM | HEIGHT: 68 IN | OXYGEN SATURATION: 98 % | BODY MASS INDEX: 32.58 KG/M2

## 2021-11-05 ENCOUNTER — DOCUMENTATION ONLY (OUTPATIENT)
Dept: ORTHOPEDICS | Facility: CLINIC | Age: 31
End: 2021-11-05

## 2021-11-05 ENCOUNTER — PATIENT MESSAGE (OUTPATIENT)
Dept: ADMINISTRATIVE | Facility: OTHER | Age: 31
End: 2021-11-05
Payer: COMMERCIAL

## 2021-11-05 ENCOUNTER — OFFICE VISIT (OUTPATIENT)
Dept: ORTHOPEDICS | Facility: CLINIC | Age: 31
End: 2021-11-05
Payer: COMMERCIAL

## 2021-11-05 VITALS
WEIGHT: 215 LBS | HEIGHT: 68 IN | SYSTOLIC BLOOD PRESSURE: 131 MMHG | HEART RATE: 65 BPM | DIASTOLIC BLOOD PRESSURE: 83 MMHG | BODY MASS INDEX: 32.58 KG/M2

## 2021-11-05 DIAGNOSIS — Z47.89 ORTHOPEDIC AFTERCARE: ICD-10-CM

## 2021-11-05 DIAGNOSIS — S63.641A RUPTURE OF RADIAL COLLATERAL LIGAMENT OF RIGHT THUMB, INITIAL ENCOUNTER: Primary | ICD-10-CM

## 2021-11-05 PROCEDURE — 99024 PR POST-OP FOLLOW-UP VISIT: ICD-10-PCS | Mod: S$GLB,,, | Performed by: PHYSICIAN ASSISTANT

## 2021-11-05 PROCEDURE — 99999 PR PBB SHADOW E&M-EST. PATIENT-LVL III: ICD-10-PCS | Mod: PBBFAC,,, | Performed by: PHYSICIAN ASSISTANT

## 2021-11-05 PROCEDURE — 29075 PR APPLY FOREARM CAST: ICD-10-PCS | Mod: 58,RT,S$GLB, | Performed by: PHYSICIAN ASSISTANT

## 2021-11-05 PROCEDURE — 29075 APPL CST ELBW FNGR SHORT ARM: CPT | Mod: 58,RT,S$GLB, | Performed by: PHYSICIAN ASSISTANT

## 2021-11-05 PROCEDURE — 99999 PR PBB SHADOW E&M-EST. PATIENT-LVL III: CPT | Mod: PBBFAC,,, | Performed by: PHYSICIAN ASSISTANT

## 2021-11-05 PROCEDURE — 99024 POSTOP FOLLOW-UP VISIT: CPT | Mod: S$GLB,,, | Performed by: PHYSICIAN ASSISTANT

## 2021-11-19 ENCOUNTER — OFFICE VISIT (OUTPATIENT)
Dept: ORTHOPEDICS | Facility: CLINIC | Age: 31
End: 2021-11-19
Payer: COMMERCIAL

## 2021-11-19 ENCOUNTER — DOCUMENTATION ONLY (OUTPATIENT)
Dept: ORTHOPEDICS | Facility: CLINIC | Age: 31
End: 2021-11-19

## 2021-11-19 ENCOUNTER — CLINICAL SUPPORT (OUTPATIENT)
Dept: REHABILITATION | Facility: HOSPITAL | Age: 31
End: 2021-11-19
Payer: COMMERCIAL

## 2021-11-19 VITALS
SYSTOLIC BLOOD PRESSURE: 140 MMHG | HEIGHT: 68 IN | DIASTOLIC BLOOD PRESSURE: 88 MMHG | BODY MASS INDEX: 32.58 KG/M2 | WEIGHT: 215 LBS | HEART RATE: 78 BPM

## 2021-11-19 DIAGNOSIS — S63.641A RUPTURE OF RADIAL COLLATERAL LIGAMENT OF RIGHT THUMB, INITIAL ENCOUNTER: Primary | ICD-10-CM

## 2021-11-19 DIAGNOSIS — S63.641A RUPTURE OF RADIAL COLLATERAL LIGAMENT OF RIGHT THUMB, INITIAL ENCOUNTER: ICD-10-CM

## 2021-11-19 DIAGNOSIS — Z47.89 ORTHOPEDIC AFTERCARE: ICD-10-CM

## 2021-11-19 PROCEDURE — L3913 HFO W/O JOINTS CF: HCPCS | Mod: PO

## 2021-11-19 PROCEDURE — 99999 PR PBB SHADOW E&M-EST. PATIENT-LVL III: CPT | Mod: PBBFAC,,, | Performed by: PHYSICIAN ASSISTANT

## 2021-11-19 PROCEDURE — 97110 THERAPEUTIC EXERCISES: CPT | Mod: PO

## 2021-11-19 PROCEDURE — 99024 POSTOP FOLLOW-UP VISIT: CPT | Mod: S$GLB,,, | Performed by: PHYSICIAN ASSISTANT

## 2021-11-19 PROCEDURE — 99024 PR POST-OP FOLLOW-UP VISIT: ICD-10-PCS | Mod: S$GLB,,, | Performed by: PHYSICIAN ASSISTANT

## 2021-11-19 PROCEDURE — 99999 PR PBB SHADOW E&M-EST. PATIENT-LVL III: ICD-10-PCS | Mod: PBBFAC,,, | Performed by: PHYSICIAN ASSISTANT

## 2021-12-10 ENCOUNTER — CLINICAL SUPPORT (OUTPATIENT)
Dept: REHABILITATION | Facility: HOSPITAL | Age: 31
End: 2021-12-10
Payer: COMMERCIAL

## 2021-12-10 DIAGNOSIS — M79.644 PAIN OF RIGHT THUMB: ICD-10-CM

## 2021-12-10 PROCEDURE — 97166 OT EVAL MOD COMPLEX 45 MIN: CPT | Mod: PO

## 2021-12-10 PROCEDURE — 97530 THERAPEUTIC ACTIVITIES: CPT | Mod: PO

## 2021-12-17 ENCOUNTER — OFFICE VISIT (OUTPATIENT)
Dept: ORTHOPEDICS | Facility: CLINIC | Age: 31
End: 2021-12-17
Payer: COMMERCIAL

## 2021-12-17 VITALS
SYSTOLIC BLOOD PRESSURE: 127 MMHG | WEIGHT: 215 LBS | HEART RATE: 88 BPM | HEIGHT: 68 IN | BODY MASS INDEX: 32.58 KG/M2 | DIASTOLIC BLOOD PRESSURE: 90 MMHG

## 2021-12-17 DIAGNOSIS — Z47.89 ORTHOPEDIC AFTERCARE: ICD-10-CM

## 2021-12-17 DIAGNOSIS — S63.641A RUPTURE OF RADIAL COLLATERAL LIGAMENT OF RIGHT THUMB, INITIAL ENCOUNTER: Primary | ICD-10-CM

## 2021-12-17 PROCEDURE — 99024 PR POST-OP FOLLOW-UP VISIT: ICD-10-PCS | Mod: S$GLB,,, | Performed by: PHYSICIAN ASSISTANT

## 2021-12-17 PROCEDURE — 99024 POSTOP FOLLOW-UP VISIT: CPT | Mod: S$GLB,,, | Performed by: PHYSICIAN ASSISTANT

## 2021-12-17 PROCEDURE — 99999 PR PBB SHADOW E&M-EST. PATIENT-LVL III: CPT | Mod: PBBFAC,,, | Performed by: PHYSICIAN ASSISTANT

## 2021-12-17 PROCEDURE — 99999 PR PBB SHADOW E&M-EST. PATIENT-LVL III: ICD-10-PCS | Mod: PBBFAC,,, | Performed by: PHYSICIAN ASSISTANT

## 2022-01-06 NOTE — PROGRESS NOTES
Occupational Therapy Daily Treatment Note     Date: 1/7/2022  Name: Freda Snyder  Clinic Number: [id number]    Therapy Diagnosis:   Encounter Diagnosis   Name Primary?    Pain of right thumb      Physician: Kalyn Rose PA    Medical Diagnosis: S63.641A (ICD-10-CM) - Sprain of metacarpophalangeal joint of right thumb, initial encounter  Physician Orders: Eval and Treat, modalities as needed  8+ visits  Evaluation Date: 12/10/2021  Plan of Care Certification Date: 3/10/2022  Authorization Period: 12/31/2021  Surgery Date and Procedure: 10/22/2021 repair right thumb radial collateral ligament  Date of Return to MD: 12/17/2021     Visit #: 1 of 10  Time In: 2:45 PM  Time Out: 3:30 PM  Total Billable Time: 40 min     Precautions: Standard     Subjective     Pt reports: Moving better  Response to previous treatment:Logan well    Pain: 0/10    Objective     Freda received the following supervised modalities after being cleared for contraindications for 10 minutes in order to promote increased blood flow and tissue elasticity:   -Fluidotherapy    Freda participated in dynamic functional therapeutic activities to improve functional performance for 30  minutes, including:  -Isospheres 3'  -Octi 3'  -Yellow putty squeezes 10, lat/2P pinches 2 logs ea, thumb ext 10        Range of Motion:   Right     Thumb AROM  MP  IP  RA  PA  OPP    19/  45 +19/  45 48 49 SF P2   -13/  +9 -1/-9 -7 -6 +          Strength: (DEBRA Dynamometer in psi.)      Rung  +27         Pinch Strength (Measured in psi)     Key Pinch 24 +15   3pt Pinch 19 +1          Home Exercises and Education Provided     Education provided:   - Progress towards goals     Written Home Exercises Provided: Patient instructed to cont prior HEP.  Exercises were reviewed and Freda was able to demonstrate them prior to the end of the session.  Freda demonstrated good  understanding of the HEP provided.   .   See EMR under Patient Instructions for exercises provided  1/7/2022.        Assessment     Pt would continue to benefit from skilled OT to maximize RUE function.     Freda is progressing well towards his goals and there are no updates to goals at this time. Pt prognosis is Good.     Pt will continue to benefit from skilled outpatient occupational therapy to address the deficits listed in the problem list on initial evaluation provide pt/family education and to maximize pt's level of independence in the home and community environment.     Anticipated barriers to therapy: None      Pt's spiritual, cultural and educational needs considered and pt agreeable to plan of care and goals.    Goals:  LTG's (8 weeks):  1)   Increase ROM to WNL right thumb all planes to increase functional hand use for ADL's, work, recreational athletics. Progressing  2)   Increase  strength 40 lbs. to grasp hand tools. Progressing  3)   Increase lat pinch 10 psis for managing nuts/bolts. Met  4)   Decrease complaints of pain to  2 out of 10 at worst to increase functional hand use for ADL/work/leisure activities. Progressing  5)   Pt will return to near to prior level of function for ADLs and household management reporting I or Mod I with ADLs (dressing, feeding, grooming, toileting). Progressing     STG's (4 weeks)  1)   Patient to be IND with HEP and modalities for pain/edema managment. Progressing  2)   Increase ROM to 90% WNL all thumb joint planes to increase functional hand use for ADLs/work/leisure activities. Met  3)   Increase  strength 20 lbs. to improve functional grasp for ADLs/work/leisure activities. Progressing  4)   Increase lat pinch 5 psi's to increase independence with button and FM Coordination. Met  5)   Patient to be IND with Orthotic use, wear and care precautions. Met  6)   Decrease complaints of pain to  3 out of 10 at worst to increase functional hand use for ADL/work/leisure activities. Met       Plan   Cont OT to address above goals.        COLLEEN Fagan OTR/L, CHT

## 2022-01-07 ENCOUNTER — CLINICAL SUPPORT (OUTPATIENT)
Dept: REHABILITATION | Facility: HOSPITAL | Age: 32
End: 2022-01-07
Payer: COMMERCIAL

## 2022-01-07 DIAGNOSIS — M79.644 PAIN OF RIGHT THUMB: ICD-10-CM

## 2022-01-07 PROCEDURE — 97530 THERAPEUTIC ACTIVITIES: CPT | Mod: PO

## 2022-01-07 PROCEDURE — 97022 WHIRLPOOL THERAPY: CPT | Mod: PO

## 2022-01-07 NOTE — PATIENT INSTRUCTIONS
Strengthening (Resistive Putty)        Squeeze putty using thumb and all fingers.  Repeat 10-20 times. Do 2-3 sessions per day.      Palmar Pinch Strengthening (Resistive Putty)        Pinch putty between thumb and index fingertip.  Repeat 2-3 logs. Do 2-3 sessions per day.       Lateral Pinch Strengthening (Resistive Putty)        Squeeze between thumb and side of index finger.  Repeat 2-3 logs. Do 2-3 sessions per day.    Extension (Resistive Putty)        Straighten thumb inside putty loop anchored by fingers.  Repeat 10 times. Do 2-3 sessions per day.

## 2022-01-14 ENCOUNTER — CLINICAL SUPPORT (OUTPATIENT)
Dept: REHABILITATION | Facility: HOSPITAL | Age: 32
End: 2022-01-14
Payer: COMMERCIAL

## 2022-01-14 ENCOUNTER — OFFICE VISIT (OUTPATIENT)
Dept: ORTHOPEDICS | Facility: CLINIC | Age: 32
End: 2022-01-14
Payer: COMMERCIAL

## 2022-01-14 VITALS
BODY MASS INDEX: 32.58 KG/M2 | HEART RATE: 66 BPM | HEIGHT: 68 IN | WEIGHT: 215 LBS | DIASTOLIC BLOOD PRESSURE: 85 MMHG | SYSTOLIC BLOOD PRESSURE: 135 MMHG

## 2022-01-14 DIAGNOSIS — M79.644 PAIN OF RIGHT THUMB: ICD-10-CM

## 2022-01-14 DIAGNOSIS — S63.641A RUPTURE OF RADIAL COLLATERAL LIGAMENT OF RIGHT THUMB, INITIAL ENCOUNTER: Primary | ICD-10-CM

## 2022-01-14 DIAGNOSIS — Z47.89 ORTHOPEDIC AFTERCARE: ICD-10-CM

## 2022-01-14 PROCEDURE — 3008F BODY MASS INDEX DOCD: CPT | Mod: CPTII,S$GLB,, | Performed by: PHYSICIAN ASSISTANT

## 2022-01-14 PROCEDURE — 99024 POSTOP FOLLOW-UP VISIT: CPT | Mod: S$GLB,,, | Performed by: PHYSICIAN ASSISTANT

## 2022-01-14 PROCEDURE — 3079F PR MOST RECENT DIASTOLIC BLOOD PRESSURE 80-89 MM HG: ICD-10-PCS | Mod: CPTII,S$GLB,, | Performed by: PHYSICIAN ASSISTANT

## 2022-01-14 PROCEDURE — 3008F PR BODY MASS INDEX (BMI) DOCUMENTED: ICD-10-PCS | Mod: CPTII,S$GLB,, | Performed by: PHYSICIAN ASSISTANT

## 2022-01-14 PROCEDURE — 3079F DIAST BP 80-89 MM HG: CPT | Mod: CPTII,S$GLB,, | Performed by: PHYSICIAN ASSISTANT

## 2022-01-14 PROCEDURE — 3075F SYST BP GE 130 - 139MM HG: CPT | Mod: CPTII,S$GLB,, | Performed by: PHYSICIAN ASSISTANT

## 2022-01-14 PROCEDURE — 97110 THERAPEUTIC EXERCISES: CPT | Mod: PO

## 2022-01-14 PROCEDURE — 1159F MED LIST DOCD IN RCRD: CPT | Mod: CPTII,S$GLB,, | Performed by: PHYSICIAN ASSISTANT

## 2022-01-14 PROCEDURE — 99999 PR PBB SHADOW E&M-EST. PATIENT-LVL III: CPT | Mod: PBBFAC,,, | Performed by: PHYSICIAN ASSISTANT

## 2022-01-14 PROCEDURE — 99999 PR PBB SHADOW E&M-EST. PATIENT-LVL III: ICD-10-PCS | Mod: PBBFAC,,, | Performed by: PHYSICIAN ASSISTANT

## 2022-01-14 PROCEDURE — 3075F PR MOST RECENT SYSTOLIC BLOOD PRESS GE 130-139MM HG: ICD-10-PCS | Mod: CPTII,S$GLB,, | Performed by: PHYSICIAN ASSISTANT

## 2022-01-14 PROCEDURE — 1160F PR REVIEW ALL MEDS BY PRESCRIBER/CLIN PHARMACIST DOCUMENTED: ICD-10-PCS | Mod: CPTII,S$GLB,, | Performed by: PHYSICIAN ASSISTANT

## 2022-01-14 PROCEDURE — 99024 PR POST-OP FOLLOW-UP VISIT: ICD-10-PCS | Mod: S$GLB,,, | Performed by: PHYSICIAN ASSISTANT

## 2022-01-14 PROCEDURE — 97035 APP MDLTY 1+ULTRASOUND EA 15: CPT | Mod: PO

## 2022-01-14 PROCEDURE — 1159F PR MEDICATION LIST DOCUMENTED IN MEDICAL RECORD: ICD-10-PCS | Mod: CPTII,S$GLB,, | Performed by: PHYSICIAN ASSISTANT

## 2022-01-14 PROCEDURE — 97022 WHIRLPOOL THERAPY: CPT | Mod: PO

## 2022-01-14 PROCEDURE — 1160F RVW MEDS BY RX/DR IN RCRD: CPT | Mod: CPTII,S$GLB,, | Performed by: PHYSICIAN ASSISTANT

## 2022-01-14 NOTE — PROGRESS NOTES
Occupational Therapy Daily Treatment Note     Date: 1/14/2022  Name: Freda Snyder  Clinic Number: [id number]    Therapy Diagnosis:   Encounter Diagnosis   Name Primary?    Pain of right thumb      Physician: Kalyn Rose PA    Medical Diagnosis: S63.641A (ICD-10-CM) - Sprain of metacarpophalangeal joint of right thumb, initial encounter  Physician Orders: Eval and Treat, modalities as needed  8+ visits  Evaluation Date: 12/10/2021  Plan of Care Certification Date: 3/10/2022  Authorization Period: 12/31/2021  Surgery Date and Procedure: 10/22/2021 repair right thumb radial collateral ligament  Date of Return to MD: 12/17/2021     Visit #: 1 of 10  Time In: 2:45 PM  Time Out: 3:30 PM  Total Billable Time: 40 min     Precautions: Standard     Subjective     Pt reports: pain at A1 pulley of thumb   Response to previous treatment:Logan well    Pain: 0/10    Objective     Freda received the following supervised modalities after being cleared for contraindications for 10 minutes in order to promote increased blood flow and tissue elasticity:   -Fluidotherapy    Freda participated in dynamic functional therapeutic activities to improve functional performance for 30  minutes, including:  -Isospheres 3'  -Octi 3'  -Yellow putty squeezes 10, lat/2P pinches 2 logs ea, thumb ext 10    Patient receives ultrasound  for pain control and remold scar tissue to increase tissue elasticity @ 100 duty cycle, 3.3 Mhz, applied to lateral and volar thumb , intensity = 0.6 w/cm2 for 8 minutes.        Range of Motion:   Right     Thumb AROM  MP  IP  RA  PA  OPP    19/  45 +19/  45 48 49 SF P2   -13/  +9 -1/-9 -7 -6 +          Strength: (DEBRA Dynamometer in psi.)      Rung  +27         Pinch Strength (Measured in psi)     Key Pinch 24 +15   3pt Pinch 19 +1          Home Exercises and Education Provided     Education provided:   - Progress towards goals     Written Home Exercises Provided: Patient instructed to cont prior  HEP.  Exercises were reviewed and Freda was able to demonstrate them prior to the end of the session.  Freda demonstrated good  understanding of the HEP provided.   .   See EMR under Patient Instructions for exercises provided 1/7/2022.        Assessment     Pt would continue to benefit from skilled OT to maximize RUE function.     Freda is progressing well towards his goals and there are no updates to goals at this time. Pt prognosis is Good.     Pt will continue to benefit from skilled outpatient occupational therapy to address the deficits listed in the problem list on initial evaluation provide pt/family education and to maximize pt's level of independence in the home and community environment.     Anticipated barriers to therapy: None      Pt's spiritual, cultural and educational needs considered and pt agreeable to plan of care and goals.    Goals:  LTG's (8 weeks):  1)   Increase ROM to WNL right thumb all planes to increase functional hand use for ADL's, work, recreational athletics. Progressing  2)   Increase  strength 40 lbs. to grasp hand tools. Progressing  3)   Increase lat pinch 10 psis for managing nuts/bolts. Met  4)   Decrease complaints of pain to  2 out of 10 at worst to increase functional hand use for ADL/work/leisure activities. Progressing  5)   Pt will return to near to prior level of function for ADLs and household management reporting I or Mod I with ADLs (dressing, feeding, grooming, toileting). Progressing     STG's (4 weeks)  1)   Patient to be IND with HEP and modalities for pain/edema managment. Progressing  2)   Increase ROM to 90% WNL all thumb joint planes to increase functional hand use for ADLs/work/leisure activities. Met  3)   Increase  strength 20 lbs. to improve functional grasp for ADLs/work/leisure activities. Progressing  4)   Increase lat pinch 5 psi's to increase independence with button and FM Coordination. Met  5)   Patient to be IND with Orthotic use, wear and  care precautions. Met  6)   Decrease complaints of pain to  3 out of 10 at worst to increase functional hand use for ADL/work/leisure activities. Met       Plan   Cont OT to address above goals.

## 2022-01-14 NOTE — PROGRESS NOTES
"Mr. Snyder is here today for a post-operative visit.  He is 56 days status post right thumb radial collateral ligament repair by Dr. Cartwright on 10/22/2021. He reports that he is doing well. No current pain.  He has intermittently attended OT, regularly performing HEP. He is no longer using the custom orthotic. He denies fever, chills, and sweats since the time of the surgery.     Physical exam:    Vitals:    01/14/22 0805   BP: 135/85   Pulse: 66   Weight: 97.5 kg (215 lb)   Height: 5' 8" (1.727 m)   PainSc: 0-No pain     Vital signs are stable, patient is afebrile.  Patient is well dressed and well groomed, no acute distress.  Alert and oriented to person, place, and time.  Incision is healing well - clean, dry, and intact.  Nontender at the surgical scar. There is no erythema or exudate.  There is no sign of any infection. He is NVI - equal sensation over the thumb today. Good stability of the thumb.  Good ROM.    Assessment: 84 days status post right thumb radial collateral ligament repair    Plan:  Freda was seen today for post-op evaluation.    Diagnoses and all orders for this visit:    Rupture of radial collateral ligament of right thumb, initial encounter    Orthopedic aftercare        - Continue HEP  - Follow up as needed  - Gradual return to regular activity as tolerated  - Call with questions or concerns    Per Op note: Postop plans patient keep the dressing clean dry intact will see the patient back at 2 weeks time patient will be placed in a cast for 2 weeks and then therapy to be initiated no sports for 6-8 weeks        "

## 2022-01-20 NOTE — PROGRESS NOTES
Occupational Therapy Daily Treatment Note     Date: 1/21/2022  Name: Freda Snyder  Clinic Number: [id number]    Therapy Diagnosis:   Encounter Diagnosis   Name Primary?    Pain of right thumb      Physician: Kalyn Rose PA    Medical Diagnosis: S63.641A (ICD-10-CM) - Sprain of metacarpophalangeal joint of right thumb, initial encounter  Physician Orders: Eval and Treat, modalities as needed  8+ visits  Evaluation Date: 12/10/2021  Plan of Care Certification Date: 3/10/2022  Authorization Period: 12/31/2021  Surgery Date and Procedure: 10/22/2021 repair right thumb radial collateral ligament  Date of Return to MD: 12/17/2021     Visit #: 2 of 10  Time In: 7:30 AM  Time Out: 8:15 AM  Total Billable Time: 40 min     Precautions: Standard     Subjective     Pt reports: pain at A1 pulley of thumb   Response to previous treatment:Logan well    Pain: 0/10    Objective     Freda received the following supervised modalities after being cleared for contraindications for 10 minutes in order to promote increased blood flow and tissue elasticity:   Paraffin with hot pack     Freda participated in dynamic functional therapeutic activities to improve functional performance for 30  minutes, including:  -Isospheres 3'  -Octi 3'  -Yellow putty squeezes 10, lat/2P pinches 2 logs ea, thumb ext 10    Patient receives ultrasound  for pain control and remold scar tissue to increase tissue elasticity @ 100 duty cycle, 3.3 Mhz, applied to lateral and volar thumb , intensity = 0.6 w/cm2 for 8 minutes.        Range of Motion:   Right     Thumb AROM  MP  IP  RA  PA  OPP    19/  65 10/  50 65 69 SF upper P1                 Strength: (DEBRA Dynamometer in psi.)      Rung           Pinch Strength (Measured in psi)     Key Pinch 24 +15   3pt Pinch 19 +1          Home Exercises and Education Provided     Education provided:   - Progress towards goals     Written Home Exercises Provided: Patient instructed to cont prior HEP.  Exercises  were reviewed and Freda was able to demonstrate them prior to the end of the session.  Freda demonstrated good  understanding of the HEP provided.   .   See EMR under Patient Instructions for exercises provided 1/7/2022.      - applied thumb MP block to reduce A1 pulley pain 1-7-2022   Assessment     Pt would continue to benefit from skilled OT to maximize RUE function.     Freda is progressing well towards his goals and there are no updates to goals at this time. Pt prognosis is Good.     Pt will continue to benefit from skilled outpatient occupational therapy to address the deficits listed in the problem list on initial evaluation provide pt/family education and to maximize pt's level of independence in the home and community environment.     Anticipated barriers to therapy: None      Pt's spiritual, cultural and educational needs considered and pt agreeable to plan of care and goals.    Goals:  LTG's (8 weeks):  1)   Increase ROM to WNL right thumb all planes to increase functional hand use for ADL's, work, recreational athletics. Progressing  2)   Increase  strength 40 lbs. to grasp hand tools. Progressing  3)   Increase lat pinch 10 psis for managing nuts/bolts. Met  4)   Decrease complaints of pain to  2 out of 10 at worst to increase functional hand use for ADL/work/leisure activities. Progressing  5)   Pt will return to near to prior level of function for ADLs and household management reporting I or Mod I with ADLs (dressing, feeding, grooming, toileting). Progressing     STG's (4 weeks)  1)   Patient to be IND with HEP and modalities for pain/edema managment. Progressing  2)   Increase ROM to 90% WNL all thumb joint planes to increase functional hand use for ADLs/work/leisure activities. Met  3)   Increase  strength 20 lbs. to improve functional grasp for ADLs/work/leisure activities. Progressing  4)   Increase lat pinch 5 psi's to increase independence with button and FM Coordination. Met  5)    Patient to be IND with Orthotic use, wear and care precautions. Met  6)   Decrease complaints of pain to  3 out of 10 at worst to increase functional hand use for ADL/work/leisure activities. Met       Plan   Cont OT to address above goals.

## 2022-01-21 ENCOUNTER — CLINICAL SUPPORT (OUTPATIENT)
Dept: REHABILITATION | Facility: HOSPITAL | Age: 32
End: 2022-01-21
Payer: COMMERCIAL

## 2022-01-21 DIAGNOSIS — M79.644 PAIN OF RIGHT THUMB: ICD-10-CM

## 2022-01-21 PROCEDURE — 97110 THERAPEUTIC EXERCISES: CPT | Mod: PO

## 2022-01-21 PROCEDURE — 97018 PARAFFIN BATH THERAPY: CPT | Mod: PO

## 2022-01-28 ENCOUNTER — CLINICAL SUPPORT (OUTPATIENT)
Dept: REHABILITATION | Facility: HOSPITAL | Age: 32
End: 2022-01-28
Payer: COMMERCIAL

## 2022-01-28 DIAGNOSIS — M79.644 PAIN OF RIGHT THUMB: ICD-10-CM

## 2022-01-28 PROCEDURE — 97110 THERAPEUTIC EXERCISES: CPT | Mod: PO

## 2022-01-28 PROCEDURE — 97018 PARAFFIN BATH THERAPY: CPT | Mod: PO

## 2022-01-28 NOTE — PROGRESS NOTES
Occupational Therapy Daily Treatment Note     Date: 1/28/2022  Name: Freda Snyder  Clinic Number: [id number]    Therapy Diagnosis:   Encounter Diagnosis   Name Primary?    Pain of right thumb      Physician: Kalyn Rose PA    Medical Diagnosis: S63.641A (ICD-10-CM) - Sprain of metacarpophalangeal joint of right thumb, initial encounter  Physician Orders: Eval and Treat, modalities as needed  8+ visits  Evaluation Date: 12/10/2021  Plan of Care Certification Date: 3/10/2022  Authorization Period: 12/31/2021  Surgery Date and Procedure: 10/22/2021 repair right thumb radial collateral ligament  Date of Return to MD: 12/17/2021     Visit #: 4 of 10  Time In: 7:30 AM  Time Out: 8:25 AM  Total Billable Time: 55 min     Precautions: Standard     Subjective     Pt reports: no pain with MP flexion since wearing orthosis  Response to previous treatment:Logan well    Pain: 0/10    Objective     Freda received the following supervised modalities after being cleared for contraindications for 10 minutes in order to promote increased blood flow and tissue elasticity:   Paraffin with hot pack with IP flexion of thumb to increase motion     Freda participated in dynamic functional therapeutic activities to improve functional performance for 30  minutes, including:  - IP flexion of MP block  - therex for strenghtening    Patient receives ultrasound  for pain control and remold scar tissue to increase tissue elasticity @ 100 duty cycle, 3.3 Mhz, applied to lateral and volar thumb , intensity = 0.6 w/cm2 for 8 minutes.        Range of Motion:   Right     Thumb AROM  MP  IP  RA  PA  OPP    19/  65 10/  50 65 69 SF upper P1                 Strength: (DEBRA Dynamometer in psi.)      Rung           Pinch Strength (Measured in psi)     Key Pinch 24 +15   3pt Pinch 19 +1          Home Exercises and Education Provided     Education provided:   - Progress towards goals     Written Home Exercises Provided: Patient instructed to  cont prior HEP.  Exercises were reviewed and Freda was able to demonstrate them prior to the end of the session.  Freda demonstrated good  understanding of the HEP provided.   .   See EMR under Patient Instructions for exercises provided 1/7/2022.      - applied thumb MP block to reduce A1 pulley pain 1-7-2022   Assessment     Pt would continue to benefit from skilled OT to maximize RUE function. Pt with no pain in MP since wearing orthosis for few days . Pt will start strengthening ABP and EPL with band x 20 reps, twice daily every other day.     Freda is progressing well towards his goals and there are no updates to goals at this time. Pt prognosis is Good.     Pt will continue to benefit from skilled outpatient occupational therapy to address the deficits listed in the problem list on initial evaluation provide pt/family education and to maximize pt's level of independence in the home and community environment.     Anticipated barriers to therapy: None      Pt's spiritual, cultural and educational needs considered and pt agreeable to plan of care and goals.    Goals:  LTG's (8 weeks):  1)   Increase ROM to WNL right thumb all planes to increase functional hand use for ADL's, work, recreational athletics. Progressing  2)   Increase  strength 40 lbs. to grasp hand tools. Progressing  3)   Increase lat pinch 10 psis for managing nuts/bolts. Met  4)   Decrease complaints of pain to  2 out of 10 at worst to increase functional hand use for ADL/work/leisure activities. Progressing  5)   Pt will return to near to prior level of function for ADLs and household management reporting I or Mod I with ADLs (dressing, feeding, grooming, toileting). Progressing     STG's (4 weeks)  1)   Patient to be IND with HEP and modalities for pain/edema managment. Progressing  2)   Increase ROM to 90% WNL all thumb joint planes to increase functional hand use for ADLs/work/leisure activities. Met  3)   Increase  strength 20 lbs. to  improve functional grasp for ADLs/work/leisure activities. Progressing  4)   Increase lat pinch 5 psi's to increase independence with button and FM Coordination. Met  5)   Patient to be IND with Orthotic use, wear and care precautions. Met  6)   Decrease complaints of pain to  3 out of 10 at worst to increase functional hand use for ADL/work/leisure activities. Met       Plan   Cont OT to address above goals.

## 2024-03-11 ENCOUNTER — OFFICE VISIT (OUTPATIENT)
Dept: OTOLARYNGOLOGY | Facility: CLINIC | Age: 34
End: 2024-03-11
Payer: COMMERCIAL

## 2024-03-11 ENCOUNTER — CLINICAL SUPPORT (OUTPATIENT)
Dept: OTOLARYNGOLOGY | Facility: CLINIC | Age: 34
End: 2024-03-11
Payer: COMMERCIAL

## 2024-03-11 VITALS
SYSTOLIC BLOOD PRESSURE: 138 MMHG | WEIGHT: 222.44 LBS | BODY MASS INDEX: 33.82 KG/M2 | HEART RATE: 79 BPM | DIASTOLIC BLOOD PRESSURE: 102 MMHG

## 2024-03-11 DIAGNOSIS — H93.8X1: ICD-10-CM

## 2024-03-11 DIAGNOSIS — H61.23 BILATERAL IMPACTED CERUMEN: ICD-10-CM

## 2024-03-11 DIAGNOSIS — H69.91 EUSTACHIAN TUBE DYSFUNCTION, RIGHT: Primary | ICD-10-CM

## 2024-03-11 DIAGNOSIS — J31.0 RHINITIS, UNSPECIFIED TYPE: ICD-10-CM

## 2024-03-11 DIAGNOSIS — H66.91 RIGHT OTITIS MEDIA, UNSPECIFIED OTITIS MEDIA TYPE: Primary | ICD-10-CM

## 2024-03-11 PROCEDURE — 1159F MED LIST DOCD IN RCRD: CPT | Mod: CPTII,S$GLB,, | Performed by: NURSE PRACTITIONER

## 2024-03-11 PROCEDURE — 3080F DIAST BP >= 90 MM HG: CPT | Mod: CPTII,S$GLB,, | Performed by: NURSE PRACTITIONER

## 2024-03-11 PROCEDURE — 99204 OFFICE O/P NEW MOD 45 MIN: CPT | Mod: 25,S$GLB,, | Performed by: NURSE PRACTITIONER

## 2024-03-11 PROCEDURE — 99999 PR PBB SHADOW E&M-EST. PATIENT-LVL I: CPT | Mod: PBBFAC,,,

## 2024-03-11 PROCEDURE — 69210 REMOVE IMPACTED EAR WAX UNI: CPT | Mod: 50,S$GLB,, | Performed by: NURSE PRACTITIONER

## 2024-03-11 PROCEDURE — 3008F BODY MASS INDEX DOCD: CPT | Mod: CPTII,S$GLB,, | Performed by: NURSE PRACTITIONER

## 2024-03-11 PROCEDURE — 92567 TYMPANOMETRY: CPT | Mod: S$GLB,,,

## 2024-03-11 PROCEDURE — 99999 PR PBB SHADOW E&M-EST. PATIENT-LVL III: CPT | Mod: PBBFAC,,, | Performed by: NURSE PRACTITIONER

## 2024-03-11 PROCEDURE — 1160F RVW MEDS BY RX/DR IN RCRD: CPT | Mod: CPTII,S$GLB,, | Performed by: NURSE PRACTITIONER

## 2024-03-11 PROCEDURE — 3075F SYST BP GE 130 - 139MM HG: CPT | Mod: CPTII,S$GLB,, | Performed by: NURSE PRACTITIONER

## 2024-03-11 RX ORDER — FLUTICASONE PROPIONATE 50 MCG
2 SPRAY, SUSPENSION (ML) NASAL DAILY
Qty: 9.9 ML | Refills: 11 | Status: SHIPPED | OUTPATIENT
Start: 2024-03-11

## 2024-03-11 RX ORDER — METHYLPREDNISOLONE 4 MG/1
TABLET ORAL
Qty: 21 EACH | Refills: 0 | Status: SHIPPED | OUTPATIENT
Start: 2024-03-11 | End: 2024-04-01

## 2024-03-11 RX ORDER — AMOXICILLIN AND CLAVULANATE POTASSIUM 875; 125 MG/1; MG/1
1 TABLET, FILM COATED ORAL EVERY 12 HOURS
Qty: 20 TABLET | Refills: 0 | Status: SHIPPED | OUTPATIENT
Start: 2024-03-11 | End: 2024-03-21

## 2024-03-11 NOTE — PROGRESS NOTES
"  Chief Complaint   Patient presents with    Cerumen Impaction       HPI: Freda Snyder is a 33 y.o. male who is self-referredfor bilateral ear stuffiness",ear fullness which has been present for a few weeks ago..  He reports the symptoms have been are constant.  He has been experiencing  ear popping, nasal congestion, and hearing loss. He reports history of wax in the past.   The patient reports being sick for a month. He is currently not on any nasal sprays.     Past Medical History:   Diagnosis Date    Elevated liver enzymes     Hyperlipidemia     Jwem-Jwtrz-Kyasrww disease     Osgood-Schlatter's disease      Social History     Socioeconomic History    Marital status:    Tobacco Use    Smoking status: Some Days     Types: Cigars    Smokeless tobacco: Never   Substance and Sexual Activity    Alcohol use: Yes     Comment: Glass of whiskey or couple glasses of wine typically.    Sexual activity: Yes     Partners: Female     Past Surgical History:   Procedure Laterality Date    REPAIR OF COLLATERAL LIGAMENT OF THUMB Right 10/22/2021    Procedure: REPAIR, LIGAMENT, COLLATERAL, THUMB;  Surgeon: Marine Cartwright MD;  Location: [facility];  Service: Orthopedics;  Laterality: Right;  MAC/REGIONAL     Family History   Adopted: Yes       Review of Systems  General: negative for chills, fever or weight loss  Psychological: negative for mood changes or depression  Ophthalmic: negative for blurry vision, photophobia or eye pain  ENT: see HPI  Respiratory: no cough, shortness of breath, or wheezing  Cardiovascular: no chest pain or dyspnea on exertion  Gastrointestinal: no abdominal pain, change in bowel habits, or black/ bloody stools  Musculoskeletal: negative for gait disturbance or muscular weakness  Neurological: no syncope or seizures; no ataxia  Dermatological: negative for pruritis,  rash and jaundice  Hematologic/lymphatic: no easy bruising, no new adenopathy      Physical Exam:    Vitals:    03/11/24 1111 "   BP: (!) 138/102   Pulse: 79       Constitutional: Well appearing / communicating without difficutly.  NAD.  Eyes: EOM I Bilaterally  Head/Face: Normocephalic.  Negative paranasal sinus pressure/tenderness.  Salivary glands WNL.  House Brackmann I Bilaterally.    Right Ear: Auricle normal appearance. External Auditory Canal with cerumen impaction. EAC within normal limits no lesions or masses,TM bulging with erythema.   Left Ear: Auricle normal appearance. External Auditory Canal with cerumen impaction. EAC within normal limits no lesions or masses,TM normal with mobility noted.   Nose: No gross nasal septal deviation. Inferior Turbinates 3+ bilaterally. No septal perforation. No masses/lesions. External nasal skin appears normal without masses/lesions.  Oral Cavity: Gingiva/lips within normal limits.  Dentition/gingiva healthy appearing. Mucus membranes moist. Floor of mouth soft, no masses palpated. Oral Tongue mobile. Hard Palate appears normal.    Oropharynx: Base of tongue appears normal. No masses/lesions noted. Tonsillar fossa/pharyngeal wall without lesions. Posterior oropharynx WNL.  Soft palate without masses. Midline uvula.   Neck/Lymphatic: No LAD I-VI bilaterally.  No thyromegaly.  No masses noted on exam.    Mirror laryngoscopy/nasopharyngoscopy: Active gag reflex.  Unable to perform.    Neuro/Psychiatric: AOx3.  Normal mood and affect.   Cardiovascular: Normal carotid pulses bilaterally, no increasing jugular venous distention noted at cervical region bilaterally.    Respiratory: Normal respiratory effort, no stridor, no retractions noted.    Ear Cerumen Removal    Date/Time: 3/11/2024 11:20 AM    Performed by: Rosa Maria Greenfield NP  Authorized by: Rosa Maria Greenfield NP    Consent Done?:  Yes (Verbal)    Local anesthetic:  None  Location details:  Both ears  Procedure type: curette    Procedure type comment:  Suction  Cerumen  Removal Results:  Cerumen completely removed  Patient tolerance:  Patient  tolerated the procedure well with no immediate complications    Audiogram interpreted personally by me and discussed in detail with the patient today.   Tympanometry revealed Type B tympanogram in the right ear with normal ECV of 1.99 ml  and Type A tympanogram in the left ear.       Assessment:    ICD-10-CM ICD-9-CM    1. Right otitis media, unspecified otitis media type  H66.91 382.9       2. Bilateral impacted cerumen  H61.23 380.4 Ear Cerumen Removal      3. Rhinitis, unspecified type  J31.0 472.0         The primary encounter diagnosis was Right otitis media, unspecified otitis media type. Diagnoses of Bilateral impacted cerumen and Rhinitis, unspecified type were also pertinent to this visit.      Plan:  Orders Placed This Encounter   Procedures    Ear Cerumen Removal     Cerumen impaction:  Removed under microscopy today without difficulty.  I would recommend the use of a wax softening drop, either over the counter Debrox or mineral oil, on a weekly basis.  I also instructed the patient to avoid Qtips.    -start on Augmentin bid x 10 days  -start on Medrol Dosepak  -start on Flonase 2 sprays in each nostril daily  -f/u 2-3 weeks with audiogram      Rosa Maria Greenfield NP        Answers submitted by the patient for this visit:  Review of Symptoms Questionnaire  (Submitted on 3/11/2024)  None of these: Yes  hearing loss: Yes  Voice Change?: Yes  None of these : Yes  cough: Yes  None of these : Yes  None of these: Yes  None of these: Yes  None of these: Yes  None of these : Yes  None of these: Yes  None of these : Yes  None of these: Yes  None of these: Yes  None of these: Yes

## 2024-03-11 NOTE — PROCEDURES
Ear Cerumen Removal    Date/Time: 3/11/2024 11:20 AM    Performed by: Rosa Maria Greenfield NP  Authorized by: Rosa Maira Greenfield NP    Consent Done?:  Yes (Verbal)    Local anesthetic:  None  Location details:  Both ears  Procedure type: curette    Procedure type comment:  Suction  Cerumen  Removal Results:  Cerumen completely removed  Patient tolerance:  Patient tolerated the procedure well with no immediate complications

## 2024-03-11 NOTE — PROGRESS NOTES
"Freda Snyder, a 33 y.o. male was seen today in the clinic for an tympanometry only during ENT visit for right ear "stuffiness".    Tympanometry revealed Type B tympanogram in the right ear with normal ECV of 1.99 ml  and Type A tympanogram in the left ear.        "

## 2024-03-26 ENCOUNTER — OFFICE VISIT (OUTPATIENT)
Dept: OTOLARYNGOLOGY | Facility: CLINIC | Age: 34
End: 2024-03-26
Payer: COMMERCIAL

## 2024-03-26 ENCOUNTER — CLINICAL SUPPORT (OUTPATIENT)
Dept: OTOLARYNGOLOGY | Facility: CLINIC | Age: 34
End: 2024-03-26
Payer: COMMERCIAL

## 2024-03-26 VITALS
HEART RATE: 84 BPM | BODY MASS INDEX: 33.05 KG/M2 | SYSTOLIC BLOOD PRESSURE: 138 MMHG | DIASTOLIC BLOOD PRESSURE: 90 MMHG | WEIGHT: 217.38 LBS

## 2024-03-26 DIAGNOSIS — H69.91 EUSTACHIAN TUBE DYSFUNCTION, RIGHT: ICD-10-CM

## 2024-03-26 DIAGNOSIS — H90.71 MIXED CONDUCTIVE AND SENSORINEURAL HEARING LOSS OF RIGHT EAR WITH UNRESTRICTED HEARING OF LEFT EAR: Primary | ICD-10-CM

## 2024-03-26 DIAGNOSIS — H90.71 MIXED CONDUCTIVE AND SENSORINEURAL HEARING LOSS OF RIGHT EAR WITH UNRESTRICTED HEARING OF LEFT EAR: ICD-10-CM

## 2024-03-26 DIAGNOSIS — H66.91 RIGHT OTITIS MEDIA, UNSPECIFIED OTITIS MEDIA TYPE: ICD-10-CM

## 2024-03-26 DIAGNOSIS — H69.91 EUSTACHIAN TUBE DYSFUNCTION, RIGHT: Primary | ICD-10-CM

## 2024-03-26 PROCEDURE — 1159F MED LIST DOCD IN RCRD: CPT | Mod: CPTII,S$GLB,, | Performed by: NURSE PRACTITIONER

## 2024-03-26 PROCEDURE — 3008F BODY MASS INDEX DOCD: CPT | Mod: CPTII,S$GLB,, | Performed by: NURSE PRACTITIONER

## 2024-03-26 PROCEDURE — 92557 COMPREHENSIVE HEARING TEST: CPT | Mod: S$GLB,,,

## 2024-03-26 PROCEDURE — 3075F SYST BP GE 130 - 139MM HG: CPT | Mod: CPTII,S$GLB,, | Performed by: NURSE PRACTITIONER

## 2024-03-26 PROCEDURE — 92567 TYMPANOMETRY: CPT | Mod: S$GLB,,,

## 2024-03-26 PROCEDURE — 99999 PR PBB SHADOW E&M-EST. PATIENT-LVL I: CPT | Mod: PBBFAC,,,

## 2024-03-26 PROCEDURE — 99214 OFFICE O/P EST MOD 30 MIN: CPT | Mod: S$GLB,,, | Performed by: NURSE PRACTITIONER

## 2024-03-26 PROCEDURE — 99999 PR PBB SHADOW E&M-EST. PATIENT-LVL III: CPT | Mod: PBBFAC,,, | Performed by: NURSE PRACTITIONER

## 2024-03-26 PROCEDURE — 1160F RVW MEDS BY RX/DR IN RCRD: CPT | Mod: CPTII,S$GLB,, | Performed by: NURSE PRACTITIONER

## 2024-03-26 PROCEDURE — 3080F DIAST BP >= 90 MM HG: CPT | Mod: CPTII,S$GLB,, | Performed by: NURSE PRACTITIONER

## 2024-03-26 NOTE — PROGRESS NOTES
Freda Snyder, a 33 y.o. male was seen today in the clinic for an audiologic evaluation during ENT follow up for otitis media of the right ear. Mr. Snyder noted he perceived some improvement in hearing perception in the right ear since treatment; however still perceives left ear to be better than right ear which he noted was an issue prior to ear infection. He denies tinnitus, drainage, ear pain, and dizziness. No history of noise exposure was reported.     Pure tone testing revealed mild mixed hearing loss in the right ear and normal hearing in the left ear. Speech reception thresholds were obtained at 25 dBHL in the right ear and 10 dBHL in the left ear. Speech discrimination scores were 100% in the right ear and 100% in the left ear. Tympanometry revealed Type B tympanogram in the right ear and Type A tympanogram in the left ear.    Recommendations:  Otologic evaluation  Repeat audiologic evaluation at ENT follow up  Hearing protection in noise

## 2024-03-26 NOTE — PROGRESS NOTES
"  Chief Complaint   Patient presents with    Follow-up     Ear infection       HPI 3/11/2024: Freda Snyder is a 33 y.o. male who is self-referredfor bilateral ear stuffiness",ear fullness which has been present for a few weeks ago..  He reports the symptoms have been are constant.  He has been experiencing  ear popping, nasal congestion, and hearing loss. He reports history of wax in the past.   The patient reports being sick for a month. He is currently not on any nasal sprays.     Interval HPI 3/26/2024:  Follow up visit. He reports improvement with his right ear fullness but not completely resolved. He has completed the Medrol Dosepak and Augmentin. He hasn't been using Flonase daily. He states that his nasal congestion has improved.       Past Medical History:   Diagnosis Date    Elevated liver enzymes     Hyperlipidemia     Kmpx-Fekcm-Rlmdcvt disease     Osgood-Schlatter's disease      Social History     Socioeconomic History    Marital status:    Tobacco Use    Smoking status: Some Days     Types: Cigars    Smokeless tobacco: Never   Substance and Sexual Activity    Alcohol use: Yes     Comment: Glass of whiskey or couple glasses of wine typically.    Sexual activity: Yes     Partners: Female     Past Surgical History:   Procedure Laterality Date    REPAIR OF COLLATERAL LIGAMENT OF THUMB Right 10/22/2021    Procedure: REPAIR, LIGAMENT, COLLATERAL, THUMB;  Surgeon: Marine Cartwright MD;  [facility];  Service: Orthopedics;  Laterality: Right;  MAC/REGIONAL     Family History   Adopted: Yes       Review of Systems  General: negative for chills, fever or weight loss  Psychological: negative for mood changes or depression  Ophthalmic: negative for blurry vision, photophobia or eye pain  ENT: see HPI  Respiratory: no cough, shortness of breath, or wheezing  Cardiovascular: no chest pain or dyspnea on exertion  Gastrointestinal: no abdominal pain, change in bowel habits, or black/ bloody " stools  Musculoskeletal: negative for gait disturbance or muscular weakness  Neurological: no syncope or seizures; no ataxia  Dermatological: negative for pruritis,  rash and jaundice  Hematologic/lymphatic: no easy bruising, no new adenopathy      Physical Exam:    Vitals:    03/26/24 1342   BP: (!) 138/90   Pulse: 84         Constitutional: Well appearing / communicating without difficutly.  NAD.  Eyes: EOM I Bilaterally  Head/Face: Normocephalic.  Negative paranasal sinus pressure/tenderness.  Salivary glands WNL.  House Brackmann I Bilaterally.    Right Ear: Auricle normal appearance. EAC within normal limits no lesions or masses,TM normal but unable to auto insufflate  Left Ear: Auricle normal appearance. EAC within normal limits no lesions or masses,TM normal with mobility noted.   Nose: No gross nasal septal deviation. Inferior Turbinates 3+ bilaterally. No septal perforation. No masses/lesions. External nasal skin appears normal without masses/lesions.  Oral Cavity: Gingiva/lips within normal limits.  Dentition/gingiva healthy appearing. Mucus membranes moist. Floor of mouth soft, no masses palpated. Oral Tongue mobile. Hard Palate appears normal.    Oropharynx: Base of tongue appears normal. No masses/lesions noted. Tonsillar fossa/pharyngeal wall without lesions. Posterior oropharynx WNL.  Soft palate without masses. Midline uvula.   Neck/Lymphatic: No LAD I-VI bilaterally.  No thyromegaly.  No masses noted on exam.    Mirror laryngoscopy/nasopharyngoscopy: Active gag reflex.  Unable to perform.    Neuro/Psychiatric: AOx3.  Normal mood and affect.   Cardiovascular: Normal carotid pulses bilaterally, no increasing jugular venous distention noted at cervical region bilaterally.    Respiratory: Normal respiratory effort, no stridor, no retractions noted.      Audiogram interpreted personally by me and discussed in detail with the patient today.   Pure tone testing revealed mild mixed hearing loss in the right  ear and normal hearing in the left ear. Speech reception thresholds were obtained at 25 dBHL in the right ear and 10 dBHL in the left ear. Speech discrimination scores were 100% in the right ear and 100% in the left ear. Tympanometry revealed Type B tympanogram in the right ear and Type A tympanogram in the left ear.       Assessment:    ICD-10-CM ICD-9-CM    1. Eustachian tube dysfunction, right  H69.91 381.81       2. Right otitis media, unspecified otitis media type  H66.91 382.9       3. Mixed conductive and sensorineural hearing loss of right ear with unrestricted hearing of left ear  H90.71 389.21           The primary encounter diagnosis was Eustachian tube dysfunction, right. Diagnoses of Right otitis media, unspecified otitis media type and Mixed conductive and sensorineural hearing loss of right ear with unrestricted hearing of left ear were also pertinent to this visit.      Plan:  No orders of the defined types were placed in this encounter.    We had a long discussion regarding the anatomy and function of the eustachian tube.  We discussed that the eustachian tube acts as a pump to keep the appropriate amount of pressure behind the ear drum.  I gave the patient a prescription for a nasal steroid spray to be used on a daily basis, and we discussed that it will take 2-3 weeks of daily use to achieve maximal effectiveness.  We also discussed otologic valsalva daily for gently depressurizing the middle ear.      - instructed Mr. Snyder to use Flonase 2 sprays in each nostril daily  -he will notify if there's any acute changes  -We reviewed the patient's recent audiogram and hearing loss in detail.  Wwll follow up in 6-8 weeks with repeat audiogram. If mixed hearing loss in the right ear does not improve, would order CT temporal     Rosa Maria Greenfield NP      Answers submitted by the patient for this visit:  Review of Symptoms Questionnaire  (Submitted on 3/26/2024)  None of these: Yes  hearing loss: Yes  None  of these : Yes  None of these: Yes  None of these : Yes  None of these: Yes  None of these: Yes  None of these: Yes  None of these : Yes  None of these: Yes  None of these : Yes  None of these: Yes  None of these: Yes  None of these: Yes

## 2024-05-10 ENCOUNTER — OFFICE VISIT (OUTPATIENT)
Dept: OTOLARYNGOLOGY | Facility: CLINIC | Age: 34
End: 2024-05-10
Payer: COMMERCIAL

## 2024-05-10 ENCOUNTER — CLINICAL SUPPORT (OUTPATIENT)
Dept: OTOLARYNGOLOGY | Facility: CLINIC | Age: 34
End: 2024-05-10
Payer: COMMERCIAL

## 2024-05-10 VITALS — SYSTOLIC BLOOD PRESSURE: 122 MMHG | DIASTOLIC BLOOD PRESSURE: 83 MMHG | HEART RATE: 74 BPM

## 2024-05-10 DIAGNOSIS — H93.299 ABNORMAL AUDITORY PERCEPTION, UNSPECIFIED LATERALITY: ICD-10-CM

## 2024-05-10 DIAGNOSIS — H93.8X1 EAR POPPING, RIGHT: ICD-10-CM

## 2024-05-10 DIAGNOSIS — H90.71 MIXED CONDUCTIVE AND SENSORINEURAL HEARING LOSS OF RIGHT EAR WITH UNRESTRICTED HEARING OF LEFT EAR: ICD-10-CM

## 2024-05-10 DIAGNOSIS — H69.91 EUSTACHIAN TUBE DYSFUNCTION, RIGHT: Primary | ICD-10-CM

## 2024-05-10 DIAGNOSIS — H69.90 DYSFUNCTION OF EUSTACHIAN TUBE, UNSPECIFIED LATERALITY: Primary | ICD-10-CM

## 2024-05-10 PROCEDURE — 1160F RVW MEDS BY RX/DR IN RCRD: CPT | Mod: CPTII,S$GLB,, | Performed by: NURSE PRACTITIONER

## 2024-05-10 PROCEDURE — 99999 PR PBB SHADOW E&M-EST. PATIENT-LVL I: CPT | Mod: PBBFAC,,,

## 2024-05-10 PROCEDURE — 3079F DIAST BP 80-89 MM HG: CPT | Mod: CPTII,S$GLB,, | Performed by: NURSE PRACTITIONER

## 2024-05-10 PROCEDURE — 92552 PURE TONE AUDIOMETRY AIR: CPT | Mod: 52,S$GLB,,

## 2024-05-10 PROCEDURE — 92555 SPEECH THRESHOLD AUDIOMETRY: CPT | Mod: 52,S$GLB,,

## 2024-05-10 PROCEDURE — 99213 OFFICE O/P EST LOW 20 MIN: CPT | Mod: S$GLB,,, | Performed by: NURSE PRACTITIONER

## 2024-05-10 PROCEDURE — 3074F SYST BP LT 130 MM HG: CPT | Mod: CPTII,S$GLB,, | Performed by: NURSE PRACTITIONER

## 2024-05-10 PROCEDURE — 99999 PR PBB SHADOW E&M-EST. PATIENT-LVL III: CPT | Mod: PBBFAC,,, | Performed by: NURSE PRACTITIONER

## 2024-05-10 PROCEDURE — 1159F MED LIST DOCD IN RCRD: CPT | Mod: CPTII,S$GLB,, | Performed by: NURSE PRACTITIONER

## 2024-05-10 PROCEDURE — 92567 TYMPANOMETRY: CPT | Mod: S$GLB,,,

## 2024-05-10 NOTE — PROGRESS NOTES
"Freda Snyder, a 33 y.o. male was seen today in the clinic for follow up audiologic evaluation after being treated for eustachian tube dysfunction. Mr. Snyder reports improvement in hearing perception. He also indicated his right ear "pops" frequently.     Pure tone testing revealed normal hearing in the right ear.  A speech reception threshold of 10 dBHL in the right ear was obtained. Tympanometry revealed Type A tympanograms in both ears.    Today's results are consistent with improved hearing perception in the right ear.    Recommendations:  Otologic evaluation  Audiologic evaluation as needed  Hearing protection in noise          "

## 2024-05-10 NOTE — PROGRESS NOTES
"    Chief Complaint   Patient presents with    Follow-up    Otitis Media    Hearing Loss       HPI 3/11/2024: Freda Snyder is a 33 y.o. male who is self-referredfor bilateral ear stuffiness",ear fullness which has been present for a few weeks ago..  He reports the symptoms have been are constant.  He has been experiencing  ear popping, nasal congestion, and hearing loss. He reports history of wax in the past.   The patient reports being sick for a month. He is currently not on any nasal sprays.     Interval HPI 3/26/2024:  Follow up visit. He reports improvement with his right ear fullness but not completely resolved. He has completed the Medrol Dosepak and Augmentin. He hasn't been using Flonase daily. He states that his nasal congestion has improved.     Interval HPI 5/10/2024:  He reports right ear fullness has improved. Has popping sensation in the right ear. He has been consistent with the nasal spray. No other complaints.       Past Medical History:   Diagnosis Date    Elevated liver enzymes     Hyperlipidemia     Yyvb-Ifhxs-Eziznsi disease     Osgood-Schlatter's disease      Social History     Socioeconomic History    Marital status:    Tobacco Use    Smoking status: Some Days     Types: Cigars    Smokeless tobacco: Never   Substance and Sexual Activity    Alcohol use: Yes     Comment: Glass of whiskey or couple glasses of wine typically.    Sexual activity: Yes     Partners: Female     Past Surgical History:   Procedure Laterality Date    REPAIR OF COLLATERAL LIGAMENT OF THUMB Right 10/22/2021    Procedure: REPAIR, LIGAMENT, COLLATERAL, THUMB;  Surgeon: Marine Cartwright MD;  Location: [facility];  Service: Orthopedics;  Laterality: Right;  MAC/REGIONAL     Family History   Adopted: Yes       Review of Systems  General: negative for chills, fever or weight loss  Psychological: negative for mood changes or depression  Ophthalmic: negative for blurry vision, photophobia or eye pain  ENT: see " HPI  Respiratory: no cough, shortness of breath, or wheezing  Cardiovascular: no chest pain or dyspnea on exertion  Gastrointestinal: no abdominal pain, change in bowel habits, or black/ bloody stools  Musculoskeletal: negative for gait disturbance or muscular weakness  Neurological: no syncope or seizures; no ataxia  Dermatological: negative for pruritis,  rash and jaundice  Hematologic/lymphatic: no easy bruising, no new adenopathy      Physical Exam:    Vitals:    05/10/24 0906   BP: 122/83   Pulse: 74           Constitutional: Well appearing / communicating without difficutly.  NAD.  Eyes: EOM I Bilaterally  Head/Face: Normocephalic.  Negative paranasal sinus pressure/tenderness.  Salivary glands WNL.  House Brackmann I Bilaterally.    Right Ear: Auricle normal appearance. EAC within normal limits no lesions or masses, TM normal with mobility noted.   Left Ear: Auricle normal appearance. EAC within normal limits no lesions or masses,TM normal with mobility noted.   Nose: No gross nasal septal deviation. Inferior Turbinates 3+ bilaterally. No septal perforation. No masses/lesions. External nasal skin appears normal without masses/lesions.  Oral Cavity: Gingiva/lips within normal limits.  Dentition/gingiva healthy appearing. Mucus membranes moist. Floor of mouth soft, no masses palpated. Oral Tongue mobile. Hard Palate appears normal.    Oropharynx: Base of tongue appears normal. No masses/lesions noted. Tonsillar fossa/pharyngeal wall without lesions. Posterior oropharynx WNL.  Soft palate without masses. Midline uvula.   Neck/Lymphatic: No LAD I-VI bilaterally.  No thyromegaly.  No masses noted on exam.    Mirror laryngoscopy/nasopharyngoscopy: Active gag reflex.  Unable to perform.    Neuro/Psychiatric: AOx3.  Normal mood and affect.   Cardiovascular: Normal carotid pulses bilaterally, no increasing jugular venous distention noted at cervical region bilaterally.    Respiratory: Normal respiratory effort, no  stridor, no retractions noted.      Audiogram interpreted personally by me and discussed in detail with the patient today.   Pure tone testing revealed normal hearing in the right ear.  A speech reception threshold of 10 dBHL in the right ear was obtained. Tympanometry revealed Type A tympanograms in both ears.         Assessment:    ICD-10-CM ICD-9-CM    1. Eustachian tube dysfunction, right  H69.91 381.81       2. Mixed conductive and sensorineural hearing loss of right ear with unrestricted hearing of left ear -resolved  H90.71 389.21             The primary encounter diagnosis was Eustachian tube dysfunction, right. 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      Plan:  No orders of the defined types were placed in this encounter.    -audiogram revealed normal hearing in the right ear.  Tympanometry revealed Type A tympanograms in both ears.   -continue with Flonase 2 sprays in each nostril daily  -follow up PRN    Rosa Maria Greenfield NP        Answers submitted by the patient for this visit:  Review of Symptoms Questionnaire  (Submitted on 5/3/2024)  None of these: Yes  hearing loss: Yes  None of these : Yes  None of these: Yes  None of these : Yes  None of these: Yes  None of these: Yes  None of these: Yes  None of these : Yes  None of these: Yes  None of these : Yes  None of these: Yes  None of these: Yes  None of these: Yes

## 2024-08-28 ENCOUNTER — OFFICE VISIT (OUTPATIENT)
Dept: URGENT CARE | Facility: CLINIC | Age: 34
End: 2024-08-28
Payer: COMMERCIAL

## 2024-08-28 VITALS
BODY MASS INDEX: 32.89 KG/M2 | HEART RATE: 83 BPM | DIASTOLIC BLOOD PRESSURE: 85 MMHG | WEIGHT: 217 LBS | RESPIRATION RATE: 20 BRPM | SYSTOLIC BLOOD PRESSURE: 129 MMHG | TEMPERATURE: 98 F | HEIGHT: 68 IN | OXYGEN SATURATION: 98 %

## 2024-08-28 DIAGNOSIS — R09.A2 GLOBUS SENSATION: Primary | ICD-10-CM

## 2024-08-28 DIAGNOSIS — J02.9 SORE THROAT: ICD-10-CM

## 2024-08-28 LAB
CTP QC/QA: YES
MOLECULAR STREP A: NEGATIVE

## 2024-08-28 PROCEDURE — 99213 OFFICE O/P EST LOW 20 MIN: CPT | Mod: S$GLB,,,

## 2024-08-28 PROCEDURE — 87651 STREP A DNA AMP PROBE: CPT | Mod: QW,S$GLB,,

## 2024-08-28 RX ORDER — LIDOCAINE HYDROCHLORIDE 20 MG/ML
10 SOLUTION OROPHARYNGEAL ONCE
Status: COMPLETED | OUTPATIENT
Start: 2024-08-28 | End: 2024-08-28

## 2024-08-28 RX ORDER — ALUMINUM HYDROXIDE, MAGNESIUM HYDROXIDE, AND SIMETHICONE 1200; 120; 1200 MG/30ML; MG/30ML; MG/30ML
30 SUSPENSION ORAL
Status: COMPLETED | OUTPATIENT
Start: 2024-08-28 | End: 2024-08-28

## 2024-08-28 RX ADMIN — LIDOCAINE HYDROCHLORIDE 10 ML: 20 SOLUTION OROPHARYNGEAL at 10:08

## 2024-08-28 RX ADMIN — ALUMINUM HYDROXIDE, MAGNESIUM HYDROXIDE, AND SIMETHICONE 30 ML: 1200; 120; 1200 SUSPENSION ORAL at 10:08

## 2024-08-28 NOTE — PROGRESS NOTES
"Subjective:      Patient ID: Freda Snyder is a 33 y.o. male.    Vitals:  height is 5' 8" (1.727 m) and weight is 98.4 kg (217 lb). His oral temperature is 98.1 °F (36.7 °C). His blood pressure is 129/85 and his pulse is 83. His respiration is 20 and oxygen saturation is 98%.     Chief Complaint: Foreign Body (Possible foreign Body in his throat )    Pt present with symptoms of- Foreign body present in the back of his throat after drinking a Coke this morning and he has a bad gag reflex and it it causing him to gag and vomit. Pt states he has no other symptoms.     Provider note starts below:  Patient presents to clinic for evaluation. States this morning he took a sip of Dr. Pepper and immediately after felt a sensation that something was stuck in his throat. He did not eat anything at the time. Denies any actual foreign body that may be lodged in the throat. States he tried to drink some water, but the foreign body sensation was still present. He also reports mild sore throat. Patient states he has a very bad gag reflex and the foreign body sensation has been causing him to vomit. He reports approximately 10 episodes of gagging/vomiting since this morning. Patient denies any difficulty breathing, trouble swallowing, nausea, abdominal pain, hematemesis, fever, chills, diarrhea. No previous episodes. Denies hx of GERD but does report occasional heartburn with margaritas.       Constitution: Negative for appetite change, chills and fever.   HENT:  Positive for sore throat. Negative for drooling, mouth sores, facial swelling, trouble swallowing and voice change.    Neck: Negative for neck pain and neck swelling.   Cardiovascular:  Negative for chest pain.   Respiratory:  Negative for cough, shortness of breath and stridor.    Gastrointestinal:  Positive for vomiting. Negative for abdominal pain, abdominal bloating, nausea, constipation and diarrhea.        +globus sensation  -hematemesis   Musculoskeletal:  Negative " for muscle cramps and muscle ache.   Skin:  Negative for pale and rash.   Neurological:  Negative for dizziness, light-headedness, headaches, disorientation and altered mental status.   Psychiatric/Behavioral:  Negative for altered mental status, disorientation and confusion.       Objective:     Physical Exam   Constitutional: He is oriented to person, place, and time.  Non-toxic appearance. He does not appear ill. No distress.   HENT:   Head: Normocephalic and atraumatic.   Ears:   Right Ear: Tympanic membrane, external ear and ear canal normal.   Left Ear: Tympanic membrane, external ear and ear canal normal.   Nose: Nose normal. No rhinorrhea or congestion.   Mouth/Throat: Mucous membranes are moist. No oropharyngeal exudate or posterior oropharyngeal erythema. Oropharynx is clear.      Comments: No tongue, uvula, or tonsillar edema. No obvious edema of oropharynx. Uvula is midline. Airway patent.   Eyes: Conjunctivae are normal. Extraocular movement intact   Neck: Neck supple.   Cardiovascular: Normal rate, regular rhythm, normal heart sounds and normal pulses.   Pulmonary/Chest: Effort normal and breath sounds normal. No stridor. No respiratory distress. He has no wheezes. He has no rhonchi. He has no rales.         Comments: No acute respiratory distress. No tripoding. Able to speak in full sentences.    Abdominal: Normal appearance.   Musculoskeletal: Normal range of motion.         General: Normal range of motion.      Cervical back: He exhibits no tenderness.   Lymphadenopathy:     He has no cervical adenopathy.   Neurological: He is alert, oriented to person, place, and time and at baseline.   Skin: Skin is warm and dry.   Psychiatric: His behavior is normal. Mood normal.   Nursing note and vitals reviewed.    Assessment:     Results for orders placed or performed in visit on 08/28/24   POCT Strep A, Molecular   Result Value Ref Range    Molecular Strep A, POC Negative Negative      "Acceptable Yes        1. Globus sensation    2. Sore throat        Plan:     Globus sensation  -     aluminum-magnesium hydroxide-simethicone 200-200-20 mg/5 mL suspension 30 mL  -     LIDOcaine viscous HCl 2% oral solution 10 mL  -     Ambulatory referral/consult to Gastroenterology    Sore throat  -     POCT Strep A, Molecular  -     (Magic mouthwash) 1:1:1 diphenhydrAMINE(Benadryl) 12.5mg/5ml liq, aluminum & magnesium hydroxide-simethicone (Maalox), LIDOcaine viscous 2%; Swish and spit 5 mLs every 4 (four) hours as needed (gargle and spit for sore throat). for mouth sores  Dispense: 360 mL; Refill: 0      Medical Decision Making:   Urgent Care Management:  No acute respiratory distress, airway patent.  Strep negative.  GI cocktail given in clinic. Pt reports some of symptoms after medication.  Recommend lifestyle modifications, magic mouthwash as needed, and follow up with GI if symptoms do not improve.  Strict ED precautions should pt have new or worsening symptoms.        Patient Instructions   Plan of Care  GI cocktail given in clinic, this should help reduce irritation in the throat.  "Magic Mouthwash" sent to pharmacy. Use as needed to help sore throat. Please swish, gargle, and spit like regular mouthwash.  Lifestyle modifications as described below.  Follow up with GI if symptoms do not improve. A referral has been placed for you to be seen with a GI doctor. You should receive a call from [facility] within the next 1-3 days to set up an appointment. If you do not receive a call, you may call our Referral Hotline at [phone] to make an appointment.  Go to the emergency room if symptoms worsen or you have trouble breathing or swallowing.    Lifestyle modifications  Lose weight (if you are overweight)  Raise the head of your bed by 6 to 8 inches - You can do this by putting blocks of wood or rubber under 2 legs of the bed or a foam wedge under the mattress.  Avoid foods that make your symptoms worse - For " some people these include coffee, chocolate, alcohol, peppermint, and fatty foods.  Stop smoking, if you smoke  Avoid late meals - Lying down with a full stomach can make reflux worse. Try to plan meals for at least 2 to 3 hours before bedtime.  Avoid tight clothing - Some people feel better if they wear comfortable clothing that does not squeeze the stomach area.     Should you develop any worsening or new symptoms after leaving urgent care, it is recommended that you go to the ER for further/repeat evaluation.      Follow up with your PCP in 3-5 days after your urgent care visit.     Please remember that you have received care at an urgent care today. Urgent cares are not emergency rooms and are not equipped to handle life threatening emergencies and cannot rule in or out certain medical conditions and you may be released before all of your medical problems are known or treated, please schedule all follow up appointments as discussed and if you have worsening symptoms please go to the ER to rule out potential life threatening problems, as discussed.

## 2024-08-28 NOTE — PATIENT INSTRUCTIONS
"Plan of Care  GI cocktail given in clinic, this should help reduce irritation in the throat.  "Magic Mouthwash" sent to pharmacy. Use as needed to help sore throat. Please swish, gargle, and spit like regular mouthwash.  Lifestyle modifications as described below.  Follow up with GI if symptoms do not improve. A referral has been placed for you to be seen with a GI doctor. You should receive a call from [facility] within the next 1-3 days to set up an appointment. If you do not receive a call, you may call our Referral Hotline at [phone] to make an appointment.  Go to the emergency room if symptoms worsen or you have trouble breathing or swallowing.    Lifestyle modifications  Lose weight (if you are overweight)  Raise the head of your bed by 6 to 8 inches - You can do this by putting blocks of wood or rubber under 2 legs of the bed or a foam wedge under the mattress.  Avoid foods that make your symptoms worse - For some people these include coffee, chocolate, alcohol, peppermint, and fatty foods.  Stop smoking, if you smoke  Avoid late meals - Lying down with a full stomach can make reflux worse. Try to plan meals for at least 2 to 3 hours before bedtime.  Avoid tight clothing - Some people feel better if they wear comfortable clothing that does not squeeze the stomach area.     Should you develop any worsening or new symptoms after leaving urgent care, it is recommended that you go to the ER for further/repeat evaluation.      Follow up with your PCP in 3-5 days after your urgent care visit.     Please remember that you have received care at an urgent care today. Urgent cares are not emergency rooms and are not equipped to handle life threatening emergencies and cannot rule in or out certain medical conditions and you may be released before all of your medical problems are known or treated, please schedule all follow up appointments as discussed and if you have worsening symptoms please go to the ER to rule " out potential life threatening problems, as discussed.

## 2024-09-20 ENCOUNTER — TELEPHONE (OUTPATIENT)
Dept: ORTHOPEDICS | Facility: CLINIC | Age: 34
End: 2024-09-20
Payer: COMMERCIAL

## 2024-09-20 ENCOUNTER — HOSPITAL ENCOUNTER (EMERGENCY)
Facility: HOSPITAL | Age: 34
Discharge: HOME | End: 2024-09-20
Attending: EMERGENCY MEDICINE
Payer: COMMERCIAL

## 2024-09-20 VITALS
WEIGHT: 215 LBS | HEIGHT: 68 IN | RESPIRATION RATE: 16 BRPM | BODY MASS INDEX: 32.58 KG/M2 | SYSTOLIC BLOOD PRESSURE: 132 MMHG | TEMPERATURE: 99 F | DIASTOLIC BLOOD PRESSURE: 85 MMHG | HEART RATE: 84 BPM | OXYGEN SATURATION: 99 %

## 2024-09-20 DIAGNOSIS — S46.211A TEAR OF RIGHT BICEPS MUSCLE, INITIAL ENCOUNTER: Primary | ICD-10-CM

## 2024-09-20 PROCEDURE — 99283 EMERGENCY DEPT VISIT LOW MDM: CPT

## 2024-09-20 RX ORDER — NAPROXEN 500 MG/1
500 TABLET ORAL 2 TIMES DAILY WITH MEALS
Qty: 30 TABLET | Refills: 0 | Status: SHIPPED | OUTPATIENT
Start: 2024-09-20

## 2024-09-20 NOTE — DISCHARGE INSTRUCTIONS

## 2024-09-20 NOTE — ED NOTES
Psych: No acute distress is noted. Pt is calm and cooperative, good eye contact.    HEENT: Denies HEENT complaint or injury. No HA, no dizziness, no blurred vision    SKIN: The skin is warm, dry and intact. Patient has normal skin turgor and moist mucus membranes, no rashes or lesions. No Breakdown noted.    MUSCULOSKELETAL: c/o right bicep pain since yesterday after playing volleyball. LROM due to pain. Minimal swelling noted    RESPIRATORY: Airway is open and patent, respirations are spontaneous; patient has a normal effort and rate. Pink nailbeds. Clear BBS noted. Denies SOB       SWELLING/TENDERNESS/R wrist TTP of the joint space, no erythema, warmth, 2+DP pulse,  strength 5/5

## 2024-09-20 NOTE — ED PROVIDER NOTES
Encounter Date: 9/20/2024       History     Chief Complaint   Patient presents with    Muscle Pain     Patient reports to the ED complaining of right bicep pain that started after playing volleyball yesterday. Patient denies taking OTC medications for pain relief. Patient ambulatory to triage, NAD noted.     33-year-old male presents to ED with concern of right biceps injury that occurred yesterday.  Patient reports he was playing volleyball when injury occurred.  He was since had pain to right biceps described as sharp, worse with touch or movement, severity 6/10.  No numbness or focal weakness or other reported injuries or other acute complaints at this time    The history is provided by the patient.     Review of patient's allergies indicates:  No Known Allergies  Past Medical History:   Diagnosis Date    Elevated liver enzymes     Hyperlipidemia     Jwyp-Tgtip-Oicppkn disease     Osgood-Schlatter's disease      Past Surgical History:   Procedure Laterality Date    REPAIR OF COLLATERAL LIGAMENT OF THUMB Right 10/22/2021    Procedure: REPAIR, LIGAMENT, COLLATERAL, THUMB;  Surgeon: Marine Cartwright MD;  Location: [facility];  Service: Orthopedics;  Laterality: Right;  MAC/REGIONAL     Family History   Adopted: Yes     Social History     Tobacco Use    Smoking status: Every Day     Types: Vaping with nicotine    Smokeless tobacco: Never   Substance Use Topics    Alcohol use: Yes     Comment: Glass of whiskey or couple glasses of wine typically.     Review of Systems   Musculoskeletal:  Positive for myalgias. Negative for arthralgias, neck pain and neck stiffness.   Neurological:  Negative for weakness and numbness.       Physical Exam     Initial Vitals [09/20/24 0741]   BP Pulse Resp Temp SpO2   132/85 84 16 98.5 °F (36.9 °C) 99 %      MAP       --         Physical Exam    Vitals reviewed.  Constitutional: He appears well-developed and well-nourished. He is active. He does not have a sickly appearance. He does  not appear ill. No distress.   HENT:   Head: Normocephalic and atraumatic.   Neck:   Normal range of motion.  Musculoskeletal:      Cervical back: Normal range of motion.      Comments: Palpable defect to right distal biceps muscle.  Positive hook test.  ROM of right shoulder and right elbow do remain intact.  RUE sensations intact.  No skin changes or wounds.  Radial pulse intact.     Neurological: He is alert. GCS eye subscore is 4. GCS verbal subscore is 5. GCS motor subscore is 6.   Skin: Skin is warm and dry.   Psychiatric: He has a normal mood and affect. His speech is normal and behavior is normal.         ED Course   Procedures  Labs Reviewed - No data to display       Imaging Results    None          Medications - No data to display  Medical Decision Making  Patient presents with concern of right biceps injury after playing volleyball yesterday.  Afebrile with vitals WNL.  Patient in no distress on exam    DDx:  Including but not limited to strain, sprain, tendon injury, intramuscular injury, less likely dislocation or fracture    Risk  Prescription drug management.               ED Course as of 09/20/24 0908   Fri Sep 20, 2024   0908 Exam findings are concerning for right distal biceps injury.  I did discuss patient with orthopedic surgeon, Dr. Krishnamurthy, to help establish close follow-up.  Ambulatory referral has been sent to Orthopedics.  Patient placed in sling in ED and will be discharge with prescription for naproxen.  Encouraged cool compress, limitations in activities and movements and close outpatient follow-up.  ED return precautions were discussed.  Patient states understanding and agrees with plan [KS]      ED Course User Index  [KS] Michel Cortes PA-C                           Clinical Impression:  Final diagnoses:  [S46.211A] Tear of right biceps muscle, initial encounter (Primary)          ED Disposition Condition    Discharge Stable          ED Prescriptions       Medication Sig Dispense  Start Date End Date Auth. Provider    naproxen (NAPROSYN) 500 MG tablet Take 1 tablet (500 mg total) by mouth 2 (two) times daily with meals. 30 tablet 9/20/2024 -- Michel Cortes PA-C          Follow-up Information       Follow up With Specialties Details Why Contact Info    Tiago Krishnamurthy Jr., MD Hand Surgery, Orthopedic Surgery Call   [address] [facility]  [address]  [facility] [address] [phone]               Michel Cortes PA-C  09/20/24 0957

## 2024-09-20 NOTE — TELEPHONE ENCOUNTER
----- Message from Tiago Krishnamurthy Jr., MD sent at 9/20/2024  9:09 AM CDT -----  Please call patient for appt in the next 3-5 days. Thx!

## 2024-09-23 ENCOUNTER — OFFICE VISIT (OUTPATIENT)
Dept: ORTHOPEDICS | Facility: CLINIC | Age: 34
End: 2024-09-23
Payer: COMMERCIAL

## 2024-09-23 VITALS — BODY MASS INDEX: 32.69 KG/M2 | HEIGHT: 68 IN

## 2024-09-23 DIAGNOSIS — S46.211A TEAR OF RIGHT BICEPS MUSCLE, INITIAL ENCOUNTER: ICD-10-CM

## 2024-09-23 DIAGNOSIS — S46.209A INJURY OF TENDON OF BICEPS: Primary | ICD-10-CM

## 2024-09-23 PROCEDURE — 3008F BODY MASS INDEX DOCD: CPT | Mod: CPTII,S$GLB,, | Performed by: ORTHOPAEDIC SURGERY

## 2024-09-23 PROCEDURE — 1159F MED LIST DOCD IN RCRD: CPT | Mod: CPTII,S$GLB,, | Performed by: ORTHOPAEDIC SURGERY

## 2024-09-23 PROCEDURE — 99213 OFFICE O/P EST LOW 20 MIN: CPT | Mod: S$GLB,,, | Performed by: ORTHOPAEDIC SURGERY

## 2024-09-23 PROCEDURE — 99999 PR PBB SHADOW E&M-EST. PATIENT-LVL III: CPT | Mod: PBBFAC,,, | Performed by: ORTHOPAEDIC SURGERY

## 2024-09-23 NOTE — PROGRESS NOTES
"Subjective:      Patient ID: Freda Snyder is a 33 y.o. male.    Chief Complaint: Consult (Right bicep tear)      HPI  Freda Snyder is a  33 y.o. male presenting today for right elbow injury.  There was a history of trauma.  Onset of symptoms began 5 days ago when he injured his right elbow playing beach volleyball he struck his arm on the sand with the acute onset of pain   He was seen in the emergency room evaluated for possible biceps tendon rupture   Given a sling he is actually feeling better today with less pain but he does have some bruising and swelling in the elbow.      Review of patient's allergies indicates:  No Known Allergies      Current Outpatient Medications   Medication Sig Dispense Refill    naproxen (NAPROSYN) 500 MG tablet Take 1 tablet (500 mg total) by mouth 2 (two) times daily with meals. 30 tablet 0     No current facility-administered medications for this visit.       Past Medical History:   Diagnosis Date    Elevated liver enzymes     Hyperlipidemia     Kvbf-Geonc-Yopeiuu disease     Osgood-Schlatter's disease        Past Surgical History:   Procedure Laterality Date    REPAIR OF COLLATERAL LIGAMENT OF THUMB Right 10/22/2021    Procedure: REPAIR, LIGAMENT, COLLATERAL, THUMB;  Surgeon: Marine Cartwright MD;  Location: [facility];  Service: Orthopedics;  Laterality: Right;  MAC/REGIONAL       Review of Systems:  ROS    OBJECTIVE:     PHYSICAL EXAM:  Height: 5' 8" (172.7 cm)    Vitals:    09/23/24 1339   Height: 5' 8" (1.727 m)   PainSc:   4   PainLoc: Arm     Well developed, well nourished male in no acute distress  Alert and oriented x 3  HEENT- Normal exam  Lungs- Clear to auscultation  Heart- Regular rate and rhythm  Abdomen- Soft nontender  Extremity exam- examination of the right elbow there is some bruising swelling anteriorly in the antecubital fossa   Range of motion is full biceps strength is weak there is some slight migration of the biceps muscle belly proximally   Distally " neurologic exam intact    RADIOGRAPHS:  AP lateral x-ray of the right elbow show no bony abnormalities or fracture  Comments: I have personally reviewed the imaging and I agree with the above radiologist's report.    ASSESSMENT/PLAN:     IMPRESSION:  Possible biceps tendon rupture distal right elbow    PLAN:  I explained the nature of the injury to the patient   I have ordered MRI scan of the right elbow to check this   In the meantime gentle range of motion no heavy lifting  Follow-up 1 week after the MRI is complete       - We talked at length about the anatomy and pathophysiology of   Encounter Diagnoses   Name Primary?    Tear of right biceps muscle, initial encounter     Injury of tendon of biceps Yes           Disclaimer: This note has been generated using voice-recognition software. There may be typographical errors that have been missed during proof-reading.

## 2024-09-24 ENCOUNTER — TELEPHONE (OUTPATIENT)
Dept: ORTHOPEDICS | Facility: CLINIC | Age: 34
End: 2024-09-24
Payer: COMMERCIAL

## (undated) DEVICE — PAD CAST SPECIALIST STRL 4

## (undated) DEVICE — APPLICATOR CHLORAPREP ORN 26ML

## (undated) DEVICE — CORD BIPOLAR 12 FOOT

## (undated) DEVICE — SEE MEDLINE ITEM 157150

## (undated) DEVICE — SUT MONOCRYL PLUS 4-0 P3

## (undated) DEVICE — BANDAGE MATRIX HK LOOP 4IN 5YD

## (undated) DEVICE — Device

## (undated) DEVICE — SOL 9P NACL IRR PIC IL

## (undated) DEVICE — SPLINT PLASTER FAST SET 5X30IN

## (undated) DEVICE — SLING ARM LARGE FOAM STRAP

## (undated) DEVICE — DRESSING N ADH OIL EMUL 3X3

## (undated) DEVICE — BUCKET PLASTER DISPOSABLE

## (undated) DEVICE — DRAPE SURG W/TWL 17 5/8X23

## (undated) DEVICE — GLOVE BIOGEL ECLIPSE SZ 7

## (undated) DEVICE — TOURNIQUET SB QC DP 18X4IN

## (undated) DEVICE — GLOVE BIOGEL PI MICRO INDIC 7

## (undated) DEVICE — SCRUB 10% POVIDONE IODINE 4OZ

## (undated) DEVICE — ADHESIVE DERMABOND ADVANCED

## (undated) DEVICE — SUT 4-0 VICRYL / P-3

## (undated) DEVICE — GAUZE SPONGE 4X4 12PLY

## (undated) DEVICE — FORCEP STRAIGHT DISP

## (undated) DEVICE — UNDERPAD ULTRASORB 300LB 30X36